# Patient Record
Sex: FEMALE | Race: WHITE | NOT HISPANIC OR LATINO | URBAN - METROPOLITAN AREA
[De-identification: names, ages, dates, MRNs, and addresses within clinical notes are randomized per-mention and may not be internally consistent; named-entity substitution may affect disease eponyms.]

---

## 2019-06-21 ENCOUNTER — INPATIENT (INPATIENT)
Facility: HOSPITAL | Age: 74
LOS: 2 days | Discharge: HOME | DRG: 690 | End: 2019-06-24
Attending: INTERNAL MEDICINE | Admitting: INTERNAL MEDICINE
Payer: MEDICARE

## 2019-06-21 VITALS
RESPIRATION RATE: 20 BRPM | OXYGEN SATURATION: 96 % | TEMPERATURE: 98 F | HEART RATE: 83 BPM | WEIGHT: 126.1 LBS | DIASTOLIC BLOOD PRESSURE: 70 MMHG | SYSTOLIC BLOOD PRESSURE: 115 MMHG

## 2019-06-21 DIAGNOSIS — E80.6 OTHER DISORDERS OF BILIRUBIN METABOLISM: ICD-10-CM

## 2019-06-21 DIAGNOSIS — I10 ESSENTIAL (PRIMARY) HYPERTENSION: ICD-10-CM

## 2019-06-21 DIAGNOSIS — L40.50 ARTHROPATHIC PSORIASIS, UNSPECIFIED: ICD-10-CM

## 2019-06-21 DIAGNOSIS — N10 ACUTE PYELONEPHRITIS: ICD-10-CM

## 2019-06-21 DIAGNOSIS — R94.5 ABNORMAL RESULTS OF LIVER FUNCTION STUDIES: ICD-10-CM

## 2019-06-21 DIAGNOSIS — E87.6 HYPOKALEMIA: ICD-10-CM

## 2019-06-21 LAB
ALBUMIN SERPL ELPH-MCNC: 2.9 G/DL — LOW (ref 3.3–5)
ALBUMIN SERPL ELPH-MCNC: 3.2 G/DL — LOW (ref 3.3–5)
ALP SERPL-CCNC: 245 U/L — HIGH (ref 40–120)
ALP SERPL-CCNC: 277 U/L — HIGH (ref 40–120)
ALT FLD-CCNC: 271 U/L — HIGH (ref 10–45)
ALT FLD-CCNC: 301 U/L — HIGH (ref 10–45)
ANION GAP SERPL CALC-SCNC: 16 MMOL/L — SIGNIFICANT CHANGE UP (ref 5–17)
ANION GAP SERPL CALC-SCNC: 16 MMOL/L — SIGNIFICANT CHANGE UP (ref 5–17)
APPEARANCE UR: ABNORMAL
APTT BLD: 30.8 SEC — SIGNIFICANT CHANGE UP (ref 27.5–36.3)
AST SERPL-CCNC: 303 U/L — HIGH (ref 10–40)
AST SERPL-CCNC: 359 U/L — HIGH (ref 10–40)
BACTERIA # UR AUTO: ABNORMAL
BASE EXCESS BLDV CALC-SCNC: 6.7 MMOL/L — HIGH (ref -2–2)
BASOPHILS # BLD AUTO: 0 K/UL — SIGNIFICANT CHANGE UP (ref 0–0.2)
BASOPHILS NFR BLD AUTO: 0.3 % — SIGNIFICANT CHANGE UP (ref 0–2)
BILIRUB SERPL-MCNC: 2.4 MG/DL — HIGH (ref 0.2–1.2)
BILIRUB SERPL-MCNC: 2.7 MG/DL — HIGH (ref 0.2–1.2)
BILIRUB UR-MCNC: NEGATIVE — SIGNIFICANT CHANGE UP
BUN SERPL-MCNC: 12 MG/DL — SIGNIFICANT CHANGE UP (ref 7–23)
BUN SERPL-MCNC: 14 MG/DL — SIGNIFICANT CHANGE UP (ref 7–23)
CA-I SERPL-SCNC: 1.14 MMOL/L — SIGNIFICANT CHANGE UP (ref 1.12–1.3)
CALCIUM SERPL-MCNC: 9.2 MG/DL — SIGNIFICANT CHANGE UP (ref 8.4–10.5)
CALCIUM SERPL-MCNC: 9.5 MG/DL — SIGNIFICANT CHANGE UP (ref 8.4–10.5)
CHLORIDE BLDV-SCNC: 97 MMOL/L — SIGNIFICANT CHANGE UP (ref 96–108)
CHLORIDE SERPL-SCNC: 93 MMOL/L — LOW (ref 96–108)
CHLORIDE SERPL-SCNC: 95 MMOL/L — LOW (ref 96–108)
CO2 BLDV-SCNC: 34 MMOL/L — HIGH (ref 22–30)
CO2 SERPL-SCNC: 27 MMOL/L — SIGNIFICANT CHANGE UP (ref 22–31)
CO2 SERPL-SCNC: 27 MMOL/L — SIGNIFICANT CHANGE UP (ref 22–31)
COLOR SPEC: YELLOW — SIGNIFICANT CHANGE UP
CREAT SERPL-MCNC: 0.64 MG/DL — SIGNIFICANT CHANGE UP (ref 0.5–1.3)
CREAT SERPL-MCNC: 0.78 MG/DL — SIGNIFICANT CHANGE UP (ref 0.5–1.3)
DIFF PNL FLD: ABNORMAL
EOSINOPHIL # BLD AUTO: 0.1 K/UL — SIGNIFICANT CHANGE UP (ref 0–0.5)
EOSINOPHIL NFR BLD AUTO: 0.4 % — SIGNIFICANT CHANGE UP (ref 0–6)
EPI CELLS # UR: 1 /HPF — SIGNIFICANT CHANGE UP
GAS PNL BLDV: 134 MMOL/L — LOW (ref 135–145)
GAS PNL BLDV: SIGNIFICANT CHANGE UP
GAS PNL BLDV: SIGNIFICANT CHANGE UP
GLUCOSE BLDC GLUCOMTR-MCNC: 123 MG/DL — HIGH (ref 70–99)
GLUCOSE BLDV-MCNC: 164 MG/DL — HIGH (ref 70–99)
GLUCOSE SERPL-MCNC: 155 MG/DL — HIGH (ref 70–99)
GLUCOSE SERPL-MCNC: 181 MG/DL — HIGH (ref 70–99)
GLUCOSE UR QL: NEGATIVE — SIGNIFICANT CHANGE UP
HAV IGM SER-ACNC: SIGNIFICANT CHANGE UP
HBV CORE IGM SER-ACNC: SIGNIFICANT CHANGE UP
HBV SURFACE AG SER-ACNC: SIGNIFICANT CHANGE UP
HCO3 BLDV-SCNC: 33 MMOL/L — HIGH (ref 21–29)
HCT VFR BLD CALC: 46.7 % — HIGH (ref 34.5–45)
HCT VFR BLDA CALC: 50 % — SIGNIFICANT CHANGE UP (ref 39–50)
HCV AB S/CO SERPL IA: 0.08 S/CO — SIGNIFICANT CHANGE UP (ref 0–0.99)
HCV AB SERPL-IMP: SIGNIFICANT CHANGE UP
HGB BLD CALC-MCNC: 16.4 G/DL — HIGH (ref 11.5–15.5)
HGB BLD-MCNC: 15.5 G/DL — SIGNIFICANT CHANGE UP (ref 11.5–15.5)
HYALINE CASTS # UR AUTO: 3 /LPF — HIGH (ref 0–2)
INR BLD: 1.17 RATIO — HIGH (ref 0.88–1.16)
KETONES UR-MCNC: NEGATIVE — SIGNIFICANT CHANGE UP
LACTATE BLDV-MCNC: 1.9 MMOL/L — SIGNIFICANT CHANGE UP (ref 0.7–2)
LEUKOCYTE ESTERASE UR-ACNC: ABNORMAL
LYMPHOCYTES # BLD AUTO: 1.5 K/UL — SIGNIFICANT CHANGE UP (ref 1–3.3)
LYMPHOCYTES # BLD AUTO: 10.2 % — LOW (ref 13–44)
MCHC RBC-ENTMCNC: 28.9 PG — SIGNIFICANT CHANGE UP (ref 27–34)
MCHC RBC-ENTMCNC: 33.1 GM/DL — SIGNIFICANT CHANGE UP (ref 32–36)
MCV RBC AUTO: 87.2 FL — SIGNIFICANT CHANGE UP (ref 80–100)
MONOCYTES # BLD AUTO: 1.5 K/UL — HIGH (ref 0–0.9)
MONOCYTES NFR BLD AUTO: 9.8 % — SIGNIFICANT CHANGE UP (ref 2–14)
NEUTROPHILS # BLD AUTO: 11.8 K/UL — HIGH (ref 1.8–7.4)
NEUTROPHILS NFR BLD AUTO: 79.3 % — HIGH (ref 43–77)
NITRITE UR-MCNC: NEGATIVE — SIGNIFICANT CHANGE UP
PCO2 BLDV: 52 MMHG — HIGH (ref 35–50)
PH BLDV: 7.42 — SIGNIFICANT CHANGE UP (ref 7.35–7.45)
PH UR: 6.5 — SIGNIFICANT CHANGE UP (ref 5–8)
PLATELET # BLD AUTO: 319 K/UL — SIGNIFICANT CHANGE UP (ref 150–400)
PO2 BLDV: 28 MMHG — SIGNIFICANT CHANGE UP (ref 25–45)
POTASSIUM BLDV-SCNC: 3 MMOL/L — LOW (ref 3.5–5.3)
POTASSIUM SERPL-MCNC: 3 MMOL/L — LOW (ref 3.5–5.3)
POTASSIUM SERPL-MCNC: 3.1 MMOL/L — LOW (ref 3.5–5.3)
POTASSIUM SERPL-SCNC: 3 MMOL/L — LOW (ref 3.5–5.3)
POTASSIUM SERPL-SCNC: 3.1 MMOL/L — LOW (ref 3.5–5.3)
PROT SERPL-MCNC: 7 G/DL — SIGNIFICANT CHANGE UP (ref 6–8.3)
PROT SERPL-MCNC: 7.9 G/DL — SIGNIFICANT CHANGE UP (ref 6–8.3)
PROT UR-MCNC: ABNORMAL
PROTHROM AB SERPL-ACNC: 13.4 SEC — HIGH (ref 10–12.9)
RBC # BLD: 5.35 M/UL — HIGH (ref 3.8–5.2)
RBC # FLD: 12.9 % — SIGNIFICANT CHANGE UP (ref 10.3–14.5)
RBC CASTS # UR COMP ASSIST: 4 /HPF — SIGNIFICANT CHANGE UP (ref 0–4)
SAO2 % BLDV: 46 % — LOW (ref 67–88)
SODIUM SERPL-SCNC: 136 MMOL/L — SIGNIFICANT CHANGE UP (ref 135–145)
SODIUM SERPL-SCNC: 138 MMOL/L — SIGNIFICANT CHANGE UP (ref 135–145)
SP GR SPEC: 1.01 — LOW (ref 1.01–1.02)
UROBILINOGEN FLD QL: ABNORMAL
WBC # BLD: 14.9 K/UL — HIGH (ref 3.8–10.5)
WBC # FLD AUTO: 14.9 K/UL — HIGH (ref 3.8–10.5)
WBC UR QL: 33 /HPF — HIGH (ref 0–5)

## 2019-06-21 PROCEDURE — 99285 EMERGENCY DEPT VISIT HI MDM: CPT | Mod: GC

## 2019-06-21 PROCEDURE — 71045 X-RAY EXAM CHEST 1 VIEW: CPT | Mod: 26

## 2019-06-21 PROCEDURE — 74177 CT ABD & PELVIS W/CONTRAST: CPT | Mod: 26

## 2019-06-21 RX ORDER — CELECOXIB 200 MG/1
200 CAPSULE ORAL
Refills: 0 | Status: DISCONTINUED | OUTPATIENT
Start: 2019-06-21 | End: 2019-06-24

## 2019-06-21 RX ORDER — INSULIN LISPRO 100/ML
VIAL (ML) SUBCUTANEOUS
Refills: 0 | Status: DISCONTINUED | OUTPATIENT
Start: 2019-06-21 | End: 2019-06-24

## 2019-06-21 RX ORDER — CEFTRIAXONE 500 MG/1
1000 INJECTION, POWDER, FOR SOLUTION INTRAMUSCULAR; INTRAVENOUS EVERY 24 HOURS
Refills: 0 | Status: DISCONTINUED | OUTPATIENT
Start: 2019-06-21 | End: 2019-06-24

## 2019-06-21 RX ORDER — POTASSIUM CHLORIDE 20 MEQ
10 PACKET (EA) ORAL
Refills: 0 | Status: DISCONTINUED | OUTPATIENT
Start: 2019-06-21 | End: 2019-06-21

## 2019-06-21 RX ORDER — LANOLIN ALCOHOL/MO/W.PET/CERES
1 CREAM (GRAM) TOPICAL AT BEDTIME
Refills: 0 | Status: DISCONTINUED | OUTPATIENT
Start: 2019-06-21 | End: 2019-06-23

## 2019-06-21 RX ORDER — LANOLIN ALCOHOL/MO/W.PET/CERES
1 CREAM (GRAM) TOPICAL
Qty: 0 | Refills: 0 | DISCHARGE

## 2019-06-21 RX ORDER — CELECOXIB 200 MG/1
1 CAPSULE ORAL
Qty: 0 | Refills: 0 | DISCHARGE

## 2019-06-21 RX ORDER — SODIUM CHLORIDE 9 MG/ML
1000 INJECTION, SOLUTION INTRAVENOUS
Refills: 0 | Status: DISCONTINUED | OUTPATIENT
Start: 2019-06-21 | End: 2019-06-24

## 2019-06-21 RX ORDER — CEFTRIAXONE 500 MG/1
1000 INJECTION, POWDER, FOR SOLUTION INTRAMUSCULAR; INTRAVENOUS ONCE
Refills: 0 | Status: COMPLETED | OUTPATIENT
Start: 2019-06-21 | End: 2019-06-21

## 2019-06-21 RX ORDER — GLUCAGON INJECTION, SOLUTION 0.5 MG/.1ML
1 INJECTION, SOLUTION SUBCUTANEOUS ONCE
Refills: 0 | Status: DISCONTINUED | OUTPATIENT
Start: 2019-06-21 | End: 2019-06-24

## 2019-06-21 RX ORDER — SODIUM CHLORIDE 9 MG/ML
1000 INJECTION, SOLUTION INTRAVENOUS ONCE
Refills: 0 | Status: COMPLETED | OUTPATIENT
Start: 2019-06-21 | End: 2019-06-21

## 2019-06-21 RX ORDER — ENOXAPARIN SODIUM 100 MG/ML
40 INJECTION SUBCUTANEOUS DAILY
Refills: 0 | Status: DISCONTINUED | OUTPATIENT
Start: 2019-06-21 | End: 2019-06-24

## 2019-06-21 RX ORDER — AMLODIPINE BESYLATE 2.5 MG/1
1 TABLET ORAL
Qty: 0 | Refills: 0 | DISCHARGE

## 2019-06-21 RX ORDER — DEXTROSE 50 % IN WATER 50 %
15 SYRINGE (ML) INTRAVENOUS ONCE
Refills: 0 | Status: DISCONTINUED | OUTPATIENT
Start: 2019-06-21 | End: 2019-06-24

## 2019-06-21 RX ORDER — POTASSIUM CHLORIDE 20 MEQ
40 PACKET (EA) ORAL ONCE
Refills: 0 | Status: COMPLETED | OUTPATIENT
Start: 2019-06-21 | End: 2019-06-21

## 2019-06-21 RX ORDER — ACETAMINOPHEN 500 MG
650 TABLET ORAL ONCE
Refills: 0 | Status: COMPLETED | OUTPATIENT
Start: 2019-06-21 | End: 2019-06-21

## 2019-06-21 RX ORDER — DEXTROSE 50 % IN WATER 50 %
25 SYRINGE (ML) INTRAVENOUS ONCE
Refills: 0 | Status: DISCONTINUED | OUTPATIENT
Start: 2019-06-21 | End: 2019-06-24

## 2019-06-21 RX ORDER — INSULIN LISPRO 100/ML
VIAL (ML) SUBCUTANEOUS AT BEDTIME
Refills: 0 | Status: DISCONTINUED | OUTPATIENT
Start: 2019-06-21 | End: 2019-06-24

## 2019-06-21 RX ORDER — DEXTROSE 50 % IN WATER 50 %
12.5 SYRINGE (ML) INTRAVENOUS ONCE
Refills: 0 | Status: DISCONTINUED | OUTPATIENT
Start: 2019-06-21 | End: 2019-06-24

## 2019-06-21 RX ORDER — AMLODIPINE BESYLATE 2.5 MG/1
5 TABLET ORAL DAILY
Refills: 0 | Status: DISCONTINUED | OUTPATIENT
Start: 2019-06-21 | End: 2019-06-24

## 2019-06-21 RX ADMIN — Medication 650 MILLIGRAM(S): at 12:02

## 2019-06-21 RX ADMIN — SODIUM CHLORIDE 1000 MILLILITER(S): 9 INJECTION, SOLUTION INTRAVENOUS at 12:02

## 2019-06-21 RX ADMIN — CEFTRIAXONE 100 MILLIGRAM(S): 500 INJECTION, POWDER, FOR SOLUTION INTRAMUSCULAR; INTRAVENOUS at 12:02

## 2019-06-21 RX ADMIN — SODIUM CHLORIDE 500 MILLILITER(S): 9 INJECTION, SOLUTION INTRAVENOUS at 20:19

## 2019-06-21 RX ADMIN — Medication 40 MILLIEQUIVALENT(S): at 20:19

## 2019-06-21 RX ADMIN — Medication 1 MILLIGRAM(S): at 22:53

## 2019-06-21 RX ADMIN — Medication 650 MILLIGRAM(S): at 12:03

## 2019-06-21 NOTE — H&P ADULT - PROBLEM SELECTOR PLAN 2
CT abdomen noted. US pending. S/P Cholecystectomy. On Cosentyx . [facility] consulted by ED.

## 2019-06-21 NOTE — H&P ADULT - RS GEN PE MLT RESP DETAILS PC
good air movement/respirations non-labored/no rales/no chest wall tenderness/no intercostal retractions/airway patent/clear to auscultation bilaterally/breath sounds equal/no rhonchi/no subcutaneous emphysema/normal

## 2019-06-21 NOTE — ED PROVIDER NOTE - PHYSICAL EXAMINATION
*GEN:   comfortable, in no acute distress, AOx3  *EYES:   pupils equally round and reactive to light, extra-occular movements intact  *HEENT:   airway patent, moist mucosal membranes, full ROM neck  *CV:   regular rate and rhythm  *RESP:   clear to auscultation bilaterally, non-labored  *ABD:   soft, non-tender  *:   no cva/flank tenderness  *EXTREM:   arthritic fingers bilaterally, no MSK tenderness, full ROM throughout  *SKIN:   dry, intact  *NEURO:   AOx3, no focal weakness or loss of sensation, gait normal *GEN:   comfortable, in no acute distress, AOx3  *EYES:   pupils equally round and reactive to light, extra-occular movements intact  *HEENT:   airway patent, moist mucosal membranes, full ROM neck  *CV:   regular rate and rhythm  *RESP:   clear to auscultation bilaterally, non-labored  *ABD:   soft, non-tender  *:   no cva/flank tenderness  *EXTREM:   arthritic fingers bilaterally, no MSK tenderness, full ROM throughout  *SKIN:   dry, intact  *NEURO:   AOx3, no focal weakness or loss of sensation, gait normal  Attending Jewell Guido: Gen: NAD, heent: atrauamtic, eomi, perrla, mmm, op pink, uvula midline, neck; nttp, no nuchal rigidity, chest: nttp, no crepitus, cv: rrr, no murmurs, lungs: ctab, abd: soft, nontender, nondistended, no peritoneal signs, +BS, no guarding, ext: wwp, neg homans, skin: no rash, neuro: awake and alert, following commands, speech clear, sensation and strength intact, no focal deficits

## 2019-06-21 NOTE — ED PROVIDER NOTE - PROGRESS NOTE DETAILS
Attending Jewell Guido: labs shows elevated transaminitis. pt s/p cholecystectomy. abdomen soft on exam. unclear cause. consider tick borne illness, will also add on ck. pt wwp. good cap refill.a febrile rectally Federico Owens PGY2: d/w dr. vallejo agreed w/ admission, requested consult hepatology - paged gi fellow

## 2019-06-21 NOTE — ED PROVIDER NOTE - CLINICAL SUMMARY MEDICAL DECISION MAKING FREE TEXT BOX
74F w/ concern for infection, uti - will check labs, urine, cxr, reassess 74F w/ concern for infection, uti - will check labs, urine, cxr, reassess  Attending Jewell Guido: 75 y/o female sent in for concern for abnormal labs. upon arrival pt hemodynamically stable. concern for possible urinary infection vs report. pt cultured. labs showed evidence of transaminitis. pt with prior choleycstectomy. no known h/o lyme disease or risk factors for tick borne illness. no evidence of rhabo. no h/o sig tylenol use. less likely shock liver from infectious etiology. UA positive for infection and pt given abx. will ct ab/pel to further evaluate. plan to admit. will check ebv as well and viral work up

## 2019-06-21 NOTE — H&P ADULT - NEGATIVE CARDIOVASCULAR SYMPTOMS
no palpitations/no dyspnea on exertion/no peripheral edema/no paroxysmal nocturnal dyspnea/no chest pain/no orthopnea/no claudication

## 2019-06-21 NOTE — ED ADULT TRIAGE NOTE - CHIEF COMPLAINT QUOTE
She wasn't feeling well, she was weak and had fever. The doctor called and said all her labs are abnl.

## 2019-06-21 NOTE — ED ADULT NURSE NOTE - OBJECTIVE STATEMENT
74 yr old female amb to ED, accomp by daughter and family. Pt refereed for elevated WBC. Has been having frequency of urine . Awake No change in mental status Speaks Belgian Would like daughter to translate. Afebrile Had fever yesterday 101.3. H/o HTN HLD type2 dm on metformin. Denies sob or chest pain Denies abd pain Denies N/V/D Denies candida blood in urine, but had pos blood in urine test Denies back pain

## 2019-06-21 NOTE — H&P ADULT - GASTROINTESTINAL DETAILS
no distention/bowel sounds normal/no bruit/nontender/no masses palpable/no guarding/soft/no rebound tenderness/normal

## 2019-06-21 NOTE — H&P ADULT - HISTORY OF PRESENT ILLNESS
74F w/ pmh HTN, HLD, DM, psoriatic arthritis (on cosentyx, last injection last wk) s/p cholecystectomy - sent by pcp bc abn labs - has had weakness x 1 wk, difficulty ambulating, decreased PO, fever yesterday 101.3 but not today, didn't take fever meds today. No recent travel, medication change, illness, or hospitalization. No cough, chest pain, dysuria. +frequency.

## 2019-06-21 NOTE — ED PROVIDER NOTE - OBJECTIVE STATEMENT
74F w/ pmh ** sent from ** for fever and abnormal labs. Had labs 74F w/ pmh HTN, HLD, DM, psoriatic arthritis (on cosentyx, last injection last wk) s/p cholecystectomy - sent by pcp bc abn labs - has had weakness x 1 wk, difficulty ambulating, decreased PO, fever yesterday 101.3 but not today, didn't take fever meds today. No recent travel, medication change, illness, or hospitalization. No cough, chest pain, dysuria. +frequency.     Primarily Turkmen speaking, family translating at bedside.    PCP: Marycruz Herzog 74F w/ pmh HTN, HLD, DM, psoriatic arthritis (on cosentyx, last injection last wk) s/p cholecystectomy - sent by pcp bc abn labs - has had weakness x 1 wk, difficulty ambulating, decreased PO, fever yesterday 101.3 but not today, didn't take fever meds today. No recent travel, medication change, illness, or hospitalization. No cough, chest pain, dysuria. +frequency.     Primarily [place] speaking, family translating at bedside.    Abn labs 6/20: k3.2, crp 570, wbc 15.5  Normal labs 4/12/19: Cr .65, lytes wnl, CRP 11.7, WBC 11.5  UA 6/20/19: 2+ nitrite, 2+ leuk esterase, 2+ blood, , few bacteria  PCP: aMrycruz Herzog

## 2019-06-21 NOTE — ED PROVIDER NOTE - ATTENDING CONTRIBUTION TO CARE
Attending MD Jewell Guido:  I personally have seen and examined this patient.  Resident note reviewed and agree on plan of care and except where noted.  See HPI, PE, and MDM for details.

## 2019-06-22 LAB
ALBUMIN SERPL ELPH-MCNC: 2.6 G/DL — LOW (ref 3.3–5)
ALP SERPL-CCNC: 216 U/L — HIGH (ref 40–120)
ALT FLD-CCNC: 192 U/L — HIGH (ref 10–45)
ANION GAP SERPL CALC-SCNC: 18 MMOL/L — HIGH (ref 5–17)
AST SERPL-CCNC: 101 U/L — HIGH (ref 10–40)
BILIRUB DIRECT SERPL-MCNC: 0.3 MG/DL — HIGH (ref 0–0.2)
BILIRUB INDIRECT FLD-MCNC: 0.7 MG/DL — SIGNIFICANT CHANGE UP (ref 0.2–1)
BILIRUB SERPL-MCNC: 1 MG/DL — SIGNIFICANT CHANGE UP (ref 0.2–1.2)
BUN SERPL-MCNC: 9 MG/DL — SIGNIFICANT CHANGE UP (ref 7–23)
CALCIUM SERPL-MCNC: 9.3 MG/DL — SIGNIFICANT CHANGE UP (ref 8.4–10.5)
CHLORIDE SERPL-SCNC: 97 MMOL/L — SIGNIFICANT CHANGE UP (ref 96–108)
CO2 SERPL-SCNC: 24 MMOL/L — SIGNIFICANT CHANGE UP (ref 22–31)
CREAT SERPL-MCNC: 0.63 MG/DL — SIGNIFICANT CHANGE UP (ref 0.5–1.3)
CULTURE RESULTS: SIGNIFICANT CHANGE UP
EBV EA AB SER IA-ACNC: 15.3 U/ML — HIGH
EBV EA AB TITR SER IF: POSITIVE
EBV EA IGG SER-ACNC: POSITIVE
EBV NA IGG SER IA-ACNC: >600 U/ML — HIGH
EBV PATRN SPEC IB-IMP: SIGNIFICANT CHANGE UP
EBV VCA IGG AVIDITY SER QL IA: POSITIVE
EBV VCA IGM SER IA-ACNC: <10 U/ML — SIGNIFICANT CHANGE UP
EBV VCA IGM SER IA-ACNC: >750 U/ML — HIGH
EBV VCA IGM TITR FLD: NEGATIVE — SIGNIFICANT CHANGE UP
GLUCOSE BLDC GLUCOMTR-MCNC: 123 MG/DL — HIGH (ref 70–99)
GLUCOSE BLDC GLUCOMTR-MCNC: 152 MG/DL — HIGH (ref 70–99)
GLUCOSE BLDC GLUCOMTR-MCNC: 155 MG/DL — HIGH (ref 70–99)
GLUCOSE BLDC GLUCOMTR-MCNC: 172 MG/DL — HIGH (ref 70–99)
GLUCOSE SERPL-MCNC: 120 MG/DL — HIGH (ref 70–99)
HAV IGM SER-ACNC: SIGNIFICANT CHANGE UP
HBA1C BLD-MCNC: 7 % — HIGH (ref 4–5.6)
HBV CORE IGM SER-ACNC: SIGNIFICANT CHANGE UP
HBV SURFACE AG SER-ACNC: SIGNIFICANT CHANGE UP
HCT VFR BLD CALC: 43.3 % — SIGNIFICANT CHANGE UP (ref 34.5–45)
HCV AB S/CO SERPL IA: 0.08 S/CO — SIGNIFICANT CHANGE UP (ref 0–0.99)
HCV AB SERPL-IMP: SIGNIFICANT CHANGE UP
HGB BLD-MCNC: 14.3 G/DL — SIGNIFICANT CHANGE UP (ref 11.5–15.5)
MAGNESIUM SERPL-MCNC: 1.8 MG/DL — SIGNIFICANT CHANGE UP (ref 1.6–2.6)
MCHC RBC-ENTMCNC: 28.1 PG — SIGNIFICANT CHANGE UP (ref 27–34)
MCHC RBC-ENTMCNC: 33 GM/DL — SIGNIFICANT CHANGE UP (ref 32–36)
MCV RBC AUTO: 85.1 FL — SIGNIFICANT CHANGE UP (ref 80–100)
PHOSPHATE SERPL-MCNC: 4 MG/DL — SIGNIFICANT CHANGE UP (ref 2.5–4.5)
PLATELET # BLD AUTO: 358 K/UL — SIGNIFICANT CHANGE UP (ref 150–400)
POTASSIUM SERPL-MCNC: 3 MMOL/L — LOW (ref 3.5–5.3)
POTASSIUM SERPL-SCNC: 3 MMOL/L — LOW (ref 3.5–5.3)
PROT SERPL-MCNC: 6.7 G/DL — SIGNIFICANT CHANGE UP (ref 6–8.3)
RBC # BLD: 5.09 M/UL — SIGNIFICANT CHANGE UP (ref 3.8–5.2)
RBC # FLD: 14.2 % — SIGNIFICANT CHANGE UP (ref 10.3–14.5)
SODIUM SERPL-SCNC: 139 MMOL/L — SIGNIFICANT CHANGE UP (ref 135–145)
SPECIMEN SOURCE: SIGNIFICANT CHANGE UP
WBC # BLD: 14.23 K/UL — HIGH (ref 3.8–10.5)
WBC # FLD AUTO: 14.23 K/UL — HIGH (ref 3.8–10.5)

## 2019-06-22 PROCEDURE — 99222 1ST HOSP IP/OBS MODERATE 55: CPT | Mod: GC

## 2019-06-22 PROCEDURE — 99223 1ST HOSP IP/OBS HIGH 75: CPT

## 2019-06-22 RX ORDER — POTASSIUM CHLORIDE 20 MEQ
10 PACKET (EA) ORAL ONCE
Refills: 0 | Status: COMPLETED | OUTPATIENT
Start: 2019-06-22 | End: 2019-06-22

## 2019-06-22 RX ORDER — POTASSIUM CHLORIDE 20 MEQ
40 PACKET (EA) ORAL EVERY 4 HOURS
Refills: 0 | Status: COMPLETED | OUTPATIENT
Start: 2019-06-22 | End: 2019-06-22

## 2019-06-22 RX ADMIN — Medication 40 MILLIEQUIVALENT(S): at 17:45

## 2019-06-22 RX ADMIN — Medication 1 MILLIGRAM(S): at 21:49

## 2019-06-22 RX ADMIN — Medication 40 MILLIEQUIVALENT(S): at 13:18

## 2019-06-22 RX ADMIN — CELECOXIB 200 MILLIGRAM(S): 200 CAPSULE ORAL at 06:10

## 2019-06-22 RX ADMIN — CEFTRIAXONE 100 MILLIGRAM(S): 500 INJECTION, POWDER, FOR SOLUTION INTRAMUSCULAR; INTRAVENOUS at 13:18

## 2019-06-22 RX ADMIN — Medication 100 MILLIEQUIVALENT(S): at 13:17

## 2019-06-22 RX ADMIN — Medication 1: at 17:56

## 2019-06-22 RX ADMIN — ENOXAPARIN SODIUM 40 MILLIGRAM(S): 100 INJECTION SUBCUTANEOUS at 13:17

## 2019-06-22 RX ADMIN — AMLODIPINE BESYLATE 5 MILLIGRAM(S): 2.5 TABLET ORAL at 06:10

## 2019-06-22 RX ADMIN — CELECOXIB 200 MILLIGRAM(S): 200 CAPSULE ORAL at 17:45

## 2019-06-22 NOTE — PROVIDER CONTACT NOTE (MEDICATION) - RECOMMENDATIONS
educated pt and family on diabetic protocols, pt educated to call for RN or PCA prior to eating tray so a blood glucose can be performed, pt and family verbalized understanding

## 2019-06-22 NOTE — CONSULT NOTE ADULT - SUBJECTIVE AND OBJECTIVE BOX
ED DEAL  [id number]    HISTORY OF PRESENT ILLNESS:    73 yo F hx of PsA (recently started on cosentyx 3 weeks ago), HTN, HLD, DM p/w fevers.    Patient stated she has been having weakness and decreased PO intake for 1 week. She went to her PMD and was found to have low potassium along with a fever and was told to go to the ER for evaluation. Patient was found to have pyelonephritis and was started on antibiotics.        PAST MEDICAL & SURGICAL HISTORY:  HTN (hypertension)  Psoriatic arthritis      Review of Systems:  Gen:  No fevers/chills, weight loss  HEENT: No blurry vision, no difficulty swallowing, no oral or nasal ulcers  CVS: No chest pain/palpitations  Resp: No SOB/wheezing  GI: No N/V/C/D/abdominal pain  MSK:  Skin: No new rashes  Neuro: No headaches    MEDICATIONS  (STANDING):  amLODIPine   Tablet 5 milliGRAM(s) Oral daily  cefTRIAXone   IVPB 1000 milliGRAM(s) IV Intermittent every 24 hours  celecoxib 200 milliGRAM(s) Oral two times a day  dextrose 5%. 1000 milliLiter(s) (50 mL/Hr) IV Continuous <Continuous>  dextrose 50% Injectable 12.5 Gram(s) IV Push once  dextrose 50% Injectable 25 Gram(s) IV Push once  dextrose 50% Injectable 25 Gram(s) IV Push once  enoxaparin Injectable 40 milliGRAM(s) SubCutaneous daily  insulin lispro (HumaLOG) corrective regimen sliding scale   SubCutaneous three times a day before meals  insulin lispro (HumaLOG) corrective regimen sliding scale   SubCutaneous at bedtime  melatonin 1 milliGRAM(s) Oral at bedtime  potassium chloride    Tablet ER 40 milliEquivalent(s) Oral every 4 hours    MEDICATIONS  (PRN):  dextrose 40% Gel 15 Gram(s) Oral once PRN Blood Glucose LESS THAN 70 milliGRAM(s)/deciliter  glucagon  Injectable 1 milliGRAM(s) IntraMuscular once PRN Glucose LESS THAN 70 milligrams/deciliter      Allergies    No Known Allergies    Intolerances    Vital Signs Last 24 Hrs  T(C): 36.5 (2019 13:32), Max: 37.3 (2019 16:52)  T(F): 97.7 (2019 13:32), Max: 99.1 (2019 16:52)  HR: 86 (2019 13:32) (78 - 99)  BP: 111/73 (2019 13:32) (111/73 - 144/80)  BP(mean): 88 (2019 18:57) (88 - 88)  RR: 18 (2019 13:32) (18 - 20)  SpO2: 94% (2019 13:32) (94% - 99%)    Physical Exam:  General: No apparent distress  HEENT: EOMI, MMM  CVS: +S1/S2, RRR, no murmurs/rubs/gallops  Resp: CTA b/l. No crackles/wheezing  GI: Soft, NT/ND +BS  MSK:  Neuro: AAOx3  Skin: no visible rashes    LABS:                        14.3   14.23 )-----------( 358      ( 2019 12:03 )             43.3     -    139  |  97  |  9   ----------------------------<  120<H>  3.0<L>   |  24  |  0.63    Ca    9.3      2019 06:33  Phos  4.0       Mg     1.8         TPro  6.7  /  Alb  2.6<L>  /  TBili  1.0  /  DBili  0.3<H>  /  AST  101<H>  /  ALT  192<H>  /  AlkPhos  216<H>  22    PT/INR - ( 2019 19:32 )   PT: 13.4 sec;   INR: 1.17 ratio         PTT - ( 2019 19:32 )  PTT:30.8 sec  Urinalysis Basic - ( 2019 14:30 )    Color: Yellow / Appearance: Slightly Turbid / S.009 / pH: x  Gluc: x / Ketone: Negative  / Bili: Negative / Urobili: 6 mg/dL   Blood: x / Protein: Trace / Nitrite: Negative   Leuk Esterase: Large / RBC: 4 /hpf / WBC 33 /HPF   Sq Epi: x / Non Sq Epi: 1 /hpf / Bacteria: Few        RADIOLOGY & ADDITIONAL STUDIES:    EXAM:  CT ABDOMEN AND PELVIS IC                            PROCEDURE DATE:  2019            INTERPRETATION:  CLINICAL INFORMATION: Weakness for one week, difficulty   ambulating, fever, urinary frequency.    COMPARISON: None    PROCEDURE:   CT of the Abdomen and Pelvis was performed with intravenous contrast.   Intravenous contrast: 90 ml Omnipaque 350. 10 ml discarded.  Oral contrast: None.  Sagittal and coronal reformats were performed.    FINDINGS:    LOWER CHEST: Bibasilar subsegmental atelectasis. Coronary artery   calcifications.    LIVER: Within normal limits.  BILE DUCTS: Mild intrahepatic and extrahepatic ductal dilatation with the   common bile duct measuring up to 9 mm with distal tapering, likely   related to patient's postcholecystectomy state.  GALLBLADDER: Cholecystectomy.  SPLEEN: Within normal limits.  PANCREAS: Within normal limits.  ADRENALS: Within normal limits.  KIDNEYS/URETERS: Striated left nephrogram and mild enhancement of the   proximal ureter. No hydronephrosis no perinephric fluid collection.    BLADDER: Small focus of air within the bladder.  REPRODUCTIVE ORGANS: Supracervical hysterectomy. Pelvic floor laxity.    BOWEL: No bowel obstruction.   PERITONEUM: No ascites.  VESSELS:  Atherosclerotic changes of the abdominal aorta which is normal   in caliber.  RETROPERITONEUM: No lymphadenopathy.    ABDOMINAL WALL: Within normal limits.  BONES: Degenerative changes of the spine.    IMPRESSION:     Findings most consistent with left pyelonephritis; correlate with   urinalysis.    No bowel obstruction. ED DEAL  [id number]    HISTORY OF PRESENT ILLNESS:    73 yo F hx of PsA (recently started on cosentyx 3 weeks ago), HTN, HLD, DM p/w fevers.    Patient stated she has been having weakness and decreased PO intake for 1 week. She went to her PMD and was found to have low potassium along with a fever and was told to go to the ER for evaluation. Patient was found to have pyelonephritis and was started on antibiotics.    Patient has had PsA for >40 years, has been on and off prednisone, last took prednisone 3 weeks ago. Patient was on humira for 10 years, stopped working and 3 weeks ago switched to cosentyx, last dose was last week. Sees Dr. Mccoy as her rheumatologist.     PAST MEDICAL & SURGICAL HISTORY:  HTN (hypertension)  Psoriatic arthritis      Review of Systems:  Gen:  No fevers/chills, weight loss  HEENT: No blurry vision, no difficulty swallowing, no oral or nasal ulcers  CVS: No chest pain/palpitations  Resp: No SOB/wheezing  GI: No N/V/C/D/abdominal pain  MSK:  Skin: No new rashes  Neuro: No headaches    MEDICATIONS  (STANDING):  amLODIPine   Tablet 5 milliGRAM(s) Oral daily  cefTRIAXone   IVPB 1000 milliGRAM(s) IV Intermittent every 24 hours  celecoxib 200 milliGRAM(s) Oral two times a day  dextrose 5%. 1000 milliLiter(s) (50 mL/Hr) IV Continuous <Continuous>  dextrose 50% Injectable 12.5 Gram(s) IV Push once  dextrose 50% Injectable 25 Gram(s) IV Push once  dextrose 50% Injectable 25 Gram(s) IV Push once  enoxaparin Injectable 40 milliGRAM(s) SubCutaneous daily  insulin lispro (HumaLOG) corrective regimen sliding scale   SubCutaneous three times a day before meals  insulin lispro (HumaLOG) corrective regimen sliding scale   SubCutaneous at bedtime  melatonin 1 milliGRAM(s) Oral at bedtime  potassium chloride    Tablet ER 40 milliEquivalent(s) Oral every 4 hours    MEDICATIONS  (PRN):  dextrose 40% Gel 15 Gram(s) Oral once PRN Blood Glucose LESS THAN 70 milliGRAM(s)/deciliter  glucagon  Injectable 1 milliGRAM(s) IntraMuscular once PRN Glucose LESS THAN 70 milligrams/deciliter      Allergies    No Known Allergies    Intolerances    Vital Signs Last 24 Hrs  T(C): 36.5 (2019 13:32), Max: 37.3 (2019 16:52)  T(F): 97.7 (2019 13:32), Max: 99.1 (2019 16:52)  HR: 86 (2019 13:32) (78 - 99)  BP: 111/73 (2019 13:32) (111/73 - 144/80)  BP(mean): 88 (2019 18:57) (88 - 88)  RR: 18 (2019 13:32) (18 - 20)  SpO2: 94% (2019 13:32) (94% - 99%)    Physical Exam:  General: No apparent distress  HEENT: EOMI, MMM  CVS: +S1/S2, RRR, no murmurs/rubs/gallops  Resp: CTA b/l. No crackles/wheezing  GI: Soft, NT/ND +BS  MSK:  Neuro: AAOx3  Skin: no visible rashes    LABS:                        14.3   14.23 )-----------( 358      ( 2019 12:03 )             43.3     06-22    139  |  97  |  9   ----------------------------<  120<H>  3.0<L>   |  24  |  0.63    Ca    9.3      2019 06:33  Phos  4.0     06-  Mg     1.8         TPro  6.7  /  Alb  2.6<L>  /  TBili  1.0  /  DBili  0.3<H>  /  AST  101<H>  /  ALT  192<H>  /  AlkPhos  216<H>  -22    PT/INR - ( 2019 19:32 )   PT: 13.4 sec;   INR: 1.17 ratio         PTT - ( 2019 19:32 )  PTT:30.8 sec  Urinalysis Basic - ( 2019 14:30 )    Color: Yellow / Appearance: Slightly Turbid / S.009 / pH: x  Gluc: x / Ketone: Negative  / Bili: Negative / Urobili: 6 mg/dL   Blood: x / Protein: Trace / Nitrite: Negative   Leuk Esterase: Large / RBC: 4 /hpf / WBC 33 /HPF   Sq Epi: x / Non Sq Epi: 1 /hpf / Bacteria: Few        RADIOLOGY & ADDITIONAL STUDIES:    EXAM:  CT ABDOMEN AND PELVIS IC                            PROCEDURE DATE:  2019            INTERPRETATION:  CLINICAL INFORMATION: Weakness for one week, difficulty   ambulating, fever, urinary frequency.    COMPARISON: None    PROCEDURE:   CT of the Abdomen and Pelvis was performed with intravenous contrast.   Intravenous contrast: 90 ml Omnipaque 350. 10 ml discarded.  Oral contrast: None.  Sagittal and coronal reformats were performed.    FINDINGS:    LOWER CHEST: Bibasilar subsegmental atelectasis. Coronary artery   calcifications.    LIVER: Within normal limits.  BILE DUCTS: Mild intrahepatic and extrahepatic ductal dilatation with the   common bile duct measuring up to 9 mm with distal tapering, likely   related to patient's postcholecystectomy state.  GALLBLADDER: Cholecystectomy.  SPLEEN: Within normal limits.  PANCREAS: Within normal limits.  ADRENALS: Within normal limits.  KIDNEYS/URETERS: Striated left nephrogram and mild enhancement of the   proximal ureter. No hydronephrosis no perinephric fluid collection.    BLADDER: Small focus of air within the bladder.  REPRODUCTIVE ORGANS: Supracervical hysterectomy. Pelvic floor laxity.    BOWEL: No bowel obstruction.   PERITONEUM: No ascites.  VESSELS:  Atherosclerotic changes of the abdominal aorta which is normal   in caliber.  RETROPERITONEUM: No lymphadenopathy.    ABDOMINAL WALL: Within normal limits.  BONES: Degenerative changes of the spine.    IMPRESSION:     Findings most consistent with left pyelonephritis; correlate with   urinalysis.    No bowel obstruction. ED DEAL  [id number]    HISTORY OF PRESENT ILLNESS:    73 yo F hx of PsA (recently started on cosentyx 3 weeks ago), HTN, HLD, DM p/w fevers.    Patient stated she has been having weakness and decreased PO intake for 1 week. She went to her PMD and was found to have low potassium along with a fever and was told to go to the ER for evaluation. Patient was found to have pyelonephritis and was started on antibiotics.    Patient has had PsA for >40 years, has been on and off prednisone, last took prednisone 3 weeks ago. Patient was on humira for 10 years, stopped working and 3 weeks ago switched to cosentyx, last dose was last week. Sees Dr. Mccoy as her rheumatologist.     PAST MEDICAL & SURGICAL HISTORY:  HTN (hypertension)  Psoriatic arthritis      Review of Systems:  Gen:  No fevers/chills, weight loss  HEENT: No blurry vision, no difficulty swallowing, no oral or nasal ulcers  CVS: No chest pain/palpitations  Resp: No SOB/wheezing  GI: No N/V/C/D/abdominal pain  MSK:  Skin: No new rashes  Neuro: No headaches    MEDICATIONS  (STANDING):  amLODIPine   Tablet 5 milliGRAM(s) Oral daily  cefTRIAXone   IVPB 1000 milliGRAM(s) IV Intermittent every 24 hours  celecoxib 200 milliGRAM(s) Oral two times a day  dextrose 5%. 1000 milliLiter(s) (50 mL/Hr) IV Continuous <Continuous>  dextrose 50% Injectable 12.5 Gram(s) IV Push once  dextrose 50% Injectable 25 Gram(s) IV Push once  dextrose 50% Injectable 25 Gram(s) IV Push once  enoxaparin Injectable 40 milliGRAM(s) SubCutaneous daily  insulin lispro (HumaLOG) corrective regimen sliding scale   SubCutaneous three times a day before meals  insulin lispro (HumaLOG) corrective regimen sliding scale   SubCutaneous at bedtime  melatonin 1 milliGRAM(s) Oral at bedtime  potassium chloride    Tablet ER 40 milliEquivalent(s) Oral every 4 hours    MEDICATIONS  (PRN):  dextrose 40% Gel 15 Gram(s) Oral once PRN Blood Glucose LESS THAN 70 milliGRAM(s)/deciliter  glucagon  Injectable 1 milliGRAM(s) IntraMuscular once PRN Glucose LESS THAN 70 milligrams/deciliter      Allergies    No Known Allergies    Intolerances    Vital Signs Last 24 Hrs  T(C): 36.5 (2019 13:32), Max: 37.3 (2019 16:52)  T(F): 97.7 (2019 13:32), Max: 99.1 (2019 16:52)  HR: 86 (2019 13:32) (78 - 99)  BP: 111/73 (2019 13:32) (111/73 - 144/80)  BP(mean): 88 (2019 18:57) (88 - 88)  RR: 18 (2019 13:32) (18 - 20)  SpO2: 94% (2019 13:32) (94% - 99%)    Physical Exam:  General: No apparent distress  HEENT: EOMI, MMM  CVS: +S1/S2, RRR, no murmurs/rubs/gallops  Resp: CTA b/l. No crackles/wheezing  GI: Soft, NT/ND +BS  MSK: psoriatic mutilans hands bilaterally - cool and without active synovitis  Neuro: AAOx3  Skin: no visible rashes - no psoriasis    LABS:                        14.3   14.23 )-----------( 358      ( 2019 12:03 )             43.3     06-22    139  |  97  |  9   ----------------------------<  120<H>  3.0<L>   |  24  |  0.63    Ca    9.3      2019 06:33  Phos  4.0     06-22  Mg     1.8     06-22    TPro  6.7  /  Alb  2.6<L>  /  TBili  1.0  /  DBili  0.3<H>  /  AST  101<H>  /  ALT  192<H>  /  AlkPhos  216<H>  06-22    PT/INR - ( 2019 19:32 )   PT: 13.4 sec;   INR: 1.17 ratio         PTT - ( 2019 19:32 )  PTT:30.8 sec  Urinalysis Basic - ( 2019 14:30 )    Color: Yellow / Appearance: Slightly Turbid / S.009 / pH: x  Gluc: x / Ketone: Negative  / Bili: Negative / Urobili: 6 mg/dL   Blood: x / Protein: Trace / Nitrite: Negative   Leuk Esterase: Large / RBC: 4 /hpf / WBC 33 /HPF   Sq Epi: x / Non Sq Epi: 1 /hpf / Bacteria: Few        RADIOLOGY & ADDITIONAL STUDIES:    EXAM:  CT ABDOMEN AND PELVIS IC                            PROCEDURE DATE:  2019            INTERPRETATION:  CLINICAL INFORMATION: Weakness for one week, difficulty   ambulating, fever, urinary frequency.    COMPARISON: None    PROCEDURE:   CT of the Abdomen and Pelvis was performed with intravenous contrast.   Intravenous contrast: 90 ml Omnipaque 350. 10 ml discarded.  Oral contrast: None.  Sagittal and coronal reformats were performed.    FINDINGS:    LOWER CHEST: Bibasilar subsegmental atelectasis. Coronary artery   calcifications.    LIVER: Within normal limits.  BILE DUCTS: Mild intrahepatic and extrahepatic ductal dilatation with the   common bile duct measuring up to 9 mm with distal tapering, likely   related to patient's postcholecystectomy state.  GALLBLADDER: Cholecystectomy.  SPLEEN: Within normal limits.  PANCREAS: Within normal limits.  ADRENALS: Within normal limits.  KIDNEYS/URETERS: Striated left nephrogram and mild enhancement of the   proximal ureter. No hydronephrosis no perinephric fluid collection.    BLADDER: Small focus of air within the bladder.  REPRODUCTIVE ORGANS: Supracervical hysterectomy. Pelvic floor laxity.    BOWEL: No bowel obstruction.   PERITONEUM: No ascites.  VESSELS:  Atherosclerotic changes of the abdominal aorta which is normal   in caliber.  RETROPERITONEUM: No lymphadenopathy.    ABDOMINAL WALL: Within normal limits.  BONES: Degenerative changes of the spine.    IMPRESSION:     Findings most consistent with left pyelonephritis; correlate with   urinalysis.    No bowel obstruction.

## 2019-06-22 NOTE — PROVIDER CONTACT NOTE (MEDICATION) - ACTION/TREATMENT ORDERED:
educated pt and family, diet order changed to diabetic diet so kitchen staff will find RN or PCA prior to delivering tray

## 2019-06-22 NOTE — PROGRESS NOTE ADULT - ASSESSMENT
74F w/ pmh HTN, HLD, DM, psoriatic arthritis (on cosentyx, last injection last wk) s/p cholecystectomy - sent by pcp bc abn labs - has had weakness x 1 wk, difficulty ambulating, decreased PO, fever yesterday 101.3 but not today, didn't take fever meds today. No recent travel, medication change, illness, or hospitalization. No cough, chest pain, dysuria. +frequency.      Problem/Plan - 1:  ·  Problem: Acute pyelonephritis with UTI ; Plan: S/P Urine and blood cultures. I AV Abxs  Rocephin started in ED  and will continue.      Problem/Plan - 2:  ·  Problem: LFTs abnormal.  Plan: CT abdomen noted. US pending. S/P Cholecystectomy. On Cosentyx . [facility] consulted by ED.      Problem/Plan - 3:  ·  Problem: HTN (hypertension).  Plan: BP meds with hold parameters. BP readings fine.      Problem/Plan - 4:  ·  Problem: Psoriatic arthritis.  Plan: S/P Cosentyx and on Celebrex. Please consult Rheumatology in AM.      Problem/Plan - 5:  ·  Problem: Hypokalemia.  Plan: Replacing.

## 2019-06-22 NOTE — PROVIDER CONTACT NOTE (MEDICATION) - ASSESSMENT
pt Sammarinese speaking family at bedside speaking fluent English stating pt is "not taking sub q insulin, pt takes metformin" pt and family educated on diabetes protocols while in hospital verbalized understanding, still refusing insulin

## 2019-06-22 NOTE — CONSULT NOTE ADULT - ASSESSMENT
Impression:  # Elevated Liver Tests: - Cholestatic Pattern of elevation with improvement with treatment of infection. Suspect related to underlying sepsis. Acute Hepatitis Panel negative. No risk factors for other liver disorders  # Acute Pyelonephritis    Recommendation:  - Diet as tolerated  - Trend Liver Tests  - Supportive care per primary team    To be seen by attending in the AM    Phoebe Keita MD  Gastroenterology Fellow  [phone]  Please page on call fellow on weekends and after 5pm on weekdays

## 2019-06-22 NOTE — CONSULT NOTE ADULT - ASSESSMENT
75 yo F hx of PsA (recently started on cosentyx 3 weeks ago), HTN, HLD, DM p/w fevers.    -    Marek Pimentel MD  Rheumatology Fellow PGY IV 73 yo F hx of PsA (recently started on cosentyx 3 weeks ago), HTN, HLD, DM p/w fevers.  Acute pyelonephritis, on antibiotics.  Hold cosentyx until outpatient f/u, infection cleared.  Monitor creatinine while on celecoxib, if Cr worsens would use tylenol and celecoxib as breakthrough.  Will sign off please call with questions.    Marek Pimentel MD  Rheumatology Fellow PGY IV 73 yo F hx of PsA (recently started on cosentyx 3 weeks ago), HTN, HLD, DM p/w fevers.  Workup c/w Acute pyelonephritis now on antibiotics.    #Psoriatic Arthritis - currently inactive skin disease and joint exam with chronic changes.  -Hold cosentyx until outpatient f/u and infection cleared.    #Pain management - currently controlled on NSAID  -Monitor creatinine while on celecoxib, given current pyelo  - if Cr worsens would switch to tylenol and celecoxib as breakthrough.      Will sign off please call with questions.    Marek Pimentel MD  Rheumatology Fellow PGY IV

## 2019-06-22 NOTE — PROVIDER CONTACT NOTE (MEDICATION) - SITUATION
pt not ordered for Diabetic diet, lunch tray delivered and pt ate tray before fingerstick performed, pt  and pt and family now refusing any insulin

## 2019-06-22 NOTE — PROGRESS NOTE ADULT - SUBJECTIVE AND OBJECTIVE BOX
INTERVAL HPI/OVERNIGHT EVENTS: I feel better . Grand son and  in room.   Vital Signs Last 24 Hrs  T(C): 36.9 (2019 05:48), Max: 37.3 (2019 13:10)  T(F): 98.5 (2019 05:48), Max: 99.2 (2019 13:10)  HR: 99 (2019 05:48) (78 - 99)  BP: 144/80 (2019 05:48) (122/74 - 144/80)  BP(mean): 88 (2019 18:57) (88 - 88)  RR: 18 (2019 05:48) (18 - 20)  SpO2: 95% (2019 05:48) (95% - 99%)  I&O's Summary    2019 07:01  -  2019 07:00  --------------------------------------------------------  IN: 280 mL / OUT: 0 mL / NET: 280 mL    2019 07:01  -  2019 12:29  --------------------------------------------------------  IN: 120 mL / OUT: 0 mL / NET: 120 mL      MEDICATIONS  (STANDING):  amLODIPine   Tablet 5 milliGRAM(s) Oral daily  cefTRIAXone   IVPB 1000 milliGRAM(s) IV Intermittent every 24 hours  celecoxib 200 milliGRAM(s) Oral two times a day  dextrose 5%. 1000 milliLiter(s) (50 mL/Hr) IV Continuous <Continuous>  dextrose 50% Injectable 12.5 Gram(s) IV Push once  dextrose 50% Injectable 25 Gram(s) IV Push once  dextrose 50% Injectable 25 Gram(s) IV Push once  enoxaparin Injectable 40 milliGRAM(s) SubCutaneous daily  insulin lispro (HumaLOG) corrective regimen sliding scale   SubCutaneous three times a day before meals  insulin lispro (HumaLOG) corrective regimen sliding scale   SubCutaneous at bedtime  melatonin 1 milliGRAM(s) Oral at bedtime  potassium chloride    Tablet ER 40 milliEquivalent(s) Oral every 4 hours  potassium chloride  10 mEq/100 mL IVPB 10 milliEquivalent(s) IV Intermittent once    MEDICATIONS  (PRN):  dextrose 40% Gel 15 Gram(s) Oral once PRN Blood Glucose LESS THAN 70 milliGRAM(s)/deciliter  glucagon  Injectable 1 milliGRAM(s) IntraMuscular once PRN Glucose LESS THAN 70 milligrams/deciliter    LABS:                        15.5   14.9  )-----------( 319      ( 2019 12:17 )             46.7     -    139  |  97  |  9   ----------------------------<  120<H>  3.0<L>   |  24  |  0.63    Ca    9.3      2019 06:33  Phos  4.0       Mg     1.8         TPro  6.7  /  Alb  2.6<L>  /  TBili  1.0  /  DBili  0.3<H>  /  AST  101<H>  /  ALT  192<H>  /  AlkPhos  216<H>      PT/INR - ( 2019 19:32 )   PT: 13.4 sec;   INR: 1.17 ratio         PTT - ( 2019 19:32 )  PTT:30.8 sec  Urinalysis Basic - ( 2019 14:30 )    Color: Yellow / Appearance: Slightly Turbid / S.009 / pH: x  Gluc: x / Ketone: Negative  / Bili: Negative / Urobili: 6 mg/dL   Blood: x / Protein: Trace / Nitrite: Negative   Leuk Esterase: Large / RBC: 4 /hpf / WBC 33 /HPF   Sq Epi: x / Non Sq Epi: 1 /hpf / Bacteria: Few      CAPILLARY BLOOD GLUCOSE      POCT Blood Glucose.: 123 mg/dL (2019 09:36)  POCT Blood Glucose.: 123 mg/dL (2019 22:52)        Urinalysis Basic - ( 2019 14:30 )    Color: Yellow / Appearance: Slightly Turbid / S.009 / pH: x  Gluc: x / Ketone: Negative  / Bili: Negative / Urobili: 6 mg/dL   Blood: x / Protein: Trace / Nitrite: Negative   Leuk Esterase: Large / RBC: 4 /hpf / WBC 33 /HPF   Sq Epi: x / Non Sq Epi: 1 /hpf / Bacteria: Few      REVIEW OF SYSTEMS:  CONSTITUTIONAL: No fever, weight loss, or fatigue  EYES: No eye pain, visual disturbances, or discharge  ENMT:  No difficulty hearing, tinnitus, vertigo; No sinus or throat pain  NECK: No pain or stiffness  CARDIOVASCULAR: No chest pain, palpitations, dizziness, or leg swelling  GASTROINTESTINAL: No abdominal or epigastric pain. No nausea, vomiting, or hematemesis; No diarrhea or constipation. No melena or hematochezia.  GENITOURINARY: No dysuria, frequency, hematuria, or incontinence  NEUROLOGICAL: No headaches, memory loss, loss of strength, numbness, or tremors      Consultant(s) Notes Reviewed:  [x ] YES  [ ] NO    PHYSICAL EXAM:  GENERAL: NAD, well-groomed, well-developed,not in any distress ,  HEAD:  Atraumatic, Normocephalic  EYES: EOMI, PERRLA, conjunctiva and sclera clear  ENMT: No tonsillar erythema, exudates, or enlargement; Moist mucous membranes, Good dentition, No lesions  NECK: Supple, No JVD, Normal thyroid  NERVOUS SYSTEM:  Alert & Oriented X3, No focal deficit   CHEST/LUNG: Good air entry bilateral with no  rales, rhonchi, wheezing, or rubs  HEART: Regular rate and rhythm; No murmurs, rubs, or gallops  ABDOMEN: Soft, Nontender, Nondistended; Bowel sounds present  EXTREMITIES:  2+ Peripheral Pulses, No clubbing, cyanosis, or edema    Care Discussed with Consultants/Other Providers [ x] YES  [ ] NO

## 2019-06-22 NOTE — CONSULT NOTE ADULT - SUBJECTIVE AND OBJECTIVE BOX
Chief Complaint:  Patient is a 74y old  Female who presents with a chief complaint of weakness and frequency of urination 1 week (2019 12:29)    HPI:  74 year old female with hx of Hypertension, hyperlipidemia, DM, cholecystectomy, psoriatic arthritis (recently started on Cosentyx 3 weeks ago) presents with weakness for one week associated with decreased PO intake and difficulty ambulating. She went to her PCP, who found her to be febrile to 101.3 with abnormal labs (family recalls low potassium) prompting them to send her to the ED. She denies nausea, vomiting, diarrhea, weight loss, cough, headache, melena, hematochezia, hematemesis, recent hospitalization, chest pain, dysuria, recent travel and history of liver problems. She was diagnosed with pyelonephritis in the ED and started on Abx with improvement of symptoms.     Allergies:  No Known Allergies    Home Medications:  Reviewed    Hospital Medications:  amLODIPine   Tablet 5 milliGRAM(s) Oral daily  cefTRIAXone   IVPB 1000 milliGRAM(s) IV Intermittent every 24 hours  celecoxib 200 milliGRAM(s) Oral two times a day  dextrose 40% Gel 15 Gram(s) Oral once PRN  dextrose 5%. 1000 milliLiter(s) IV Continuous <Continuous>  dextrose 50% Injectable 12.5 Gram(s) IV Push once  dextrose 50% Injectable 25 Gram(s) IV Push once  dextrose 50% Injectable 25 Gram(s) IV Push once  enoxaparin Injectable 40 milliGRAM(s) SubCutaneous daily  glucagon  Injectable 1 milliGRAM(s) IntraMuscular once PRN  insulin lispro (HumaLOG) corrective regimen sliding scale   SubCutaneous three times a day before meals  insulin lispro (HumaLOG) corrective regimen sliding scale   SubCutaneous at bedtime  melatonin 1 milliGRAM(s) Oral at bedtime  potassium chloride    Tablet ER 40 milliEquivalent(s) Oral every 4 hours    PMHX/PSHX:  HTN (hypertension)  Psoriatic arthritis    Family history:    Denies family history of PUD, IBD, colon cancer/polyps, stomach cancer/polyps, pancreatic cancer/masses, liver cancer/disease, breast/ovarian cancer and endometrial cancer.     Social History:   No history of tobacco use, EtOH use or illicit drug use     ROS:   General:  No fevers, chills  ENT:  No sore throat or dysphagia  CV:  No pain or palpitations  Resp:  No dyspnea, cough, wheezing  GI:  No pain, No nausea, No vomiting, No diarrhea, No rectal bleeding, No tarry stools  Skin:  No rash or edema    PHYSICAL EXAM:   GENERAL:  NAD, Appears stated age  HEENT:  NC/AT,  conjunctivae clear and pink, sclera -anicteric  CHEST:  CTA B/L, Normal effort  HEART:  RRR S1/S2, No murmurs  ABDOMEN:  Soft, non-tender, non-distended, BS+  EXTEREMITIES:  No cyanosis  SKIN:  Warm & Dry.   NEURO:  Alert, oriented    Vital Signs:  Vital Signs Last 24 Hrs  T(C): 36.5 (2019 13:32), Max: 37.3 (2019 16:52)  T(F): 97.7 (2019 13:32), Max: 99.1 (2019 16:52)  HR: 86 (2019 13:32) (78 - 99)  BP: 111/73 (2019 13:32) (111/73 - 144/80)  BP(mean): 88 (2019 18:57) (88 - 88)  RR: 18 (2019 13:32) (18 - 20)  SpO2: 94% (2019 13:32) (94% - 99%)  Daily     Daily     LABS:                        14.3   14.23 )-----------( 358      ( 2019 12:03 )             43.3     Mean Cell Volume: 85.1 fl (-19 @ 12:03)        139  |  97  |  9   ----------------------------<  120<H>  3.0<L>   |  24  |  0.63    Ca    9.3      2019 06:33  Phos  4.0       Mg     1.8         TPro  6.7  /  Alb  2.6<L>  /  TBili  1.0  /  DBili  0.3<H>  /  AST  101<H>  /  ALT  192<H>  /  AlkPhos  216<H>      LIVER FUNCTIONS - ( 2019 06:33 )  Alb: 2.6 g/dL / Pro: 6.7 g/dL / ALK PHOS: 216 U/L / ALT: 192 U/L / AST: 101 U/L / GGT: x           PT/INR - ( 2019 19:32 )   PT: 13.4 sec;   INR: 1.17 ratio         PTT - ( 2019 19:32 )  PTT:30.8 sec  Urinalysis Basic - ( 2019 14:30 )    Color: Yellow / Appearance: Slightly Turbid / S.009 / pH: x  Gluc: x / Ketone: Negative  / Bili: Negative / Urobili: 6 mg/dL   Blood: x / Protein: Trace / Nitrite: Negative   Leuk Esterase: Large / RBC: 4 /hpf / WBC 33 /HPF   Sq Epi: x / Non Sq Epi: 1 /hpf / Bacteria: Few                        14.3   14.23 )-----------( 358      ( 2019 12:03 )             43.3                         15.5   14.9  )-----------( 319      ( 2019 12:17 )             46.7     Imaging:

## 2019-06-23 LAB
ALBUMIN SERPL ELPH-MCNC: 2.5 G/DL — LOW (ref 3.3–5)
ALP SERPL-CCNC: 169 U/L — HIGH (ref 40–120)
ALT FLD-CCNC: 113 U/L — HIGH (ref 10–45)
ANION GAP SERPL CALC-SCNC: 14 MMOL/L — SIGNIFICANT CHANGE UP (ref 5–17)
ANION GAP SERPL CALC-SCNC: 16 MMOL/L — SIGNIFICANT CHANGE UP (ref 5–17)
AST SERPL-CCNC: 25 U/L — SIGNIFICANT CHANGE UP (ref 10–40)
BASOPHILS # BLD AUTO: 0 K/UL — SIGNIFICANT CHANGE UP (ref 0–0.2)
BASOPHILS NFR BLD AUTO: 0 % — SIGNIFICANT CHANGE UP (ref 0–2)
BILIRUB SERPL-MCNC: 0.5 MG/DL — SIGNIFICANT CHANGE UP (ref 0.2–1.2)
BUN SERPL-MCNC: 16 MG/DL — SIGNIFICANT CHANGE UP (ref 7–23)
BUN SERPL-MCNC: 17 MG/DL — SIGNIFICANT CHANGE UP (ref 7–23)
CALCIUM SERPL-MCNC: 9.2 MG/DL — SIGNIFICANT CHANGE UP (ref 8.4–10.5)
CALCIUM SERPL-MCNC: 9.5 MG/DL — SIGNIFICANT CHANGE UP (ref 8.4–10.5)
CHLORIDE SERPL-SCNC: 101 MMOL/L — SIGNIFICANT CHANGE UP (ref 96–108)
CHLORIDE SERPL-SCNC: 101 MMOL/L — SIGNIFICANT CHANGE UP (ref 96–108)
CO2 SERPL-SCNC: 24 MMOL/L — SIGNIFICANT CHANGE UP (ref 22–31)
CO2 SERPL-SCNC: 25 MMOL/L — SIGNIFICANT CHANGE UP (ref 22–31)
CREAT SERPL-MCNC: 0.63 MG/DL — SIGNIFICANT CHANGE UP (ref 0.5–1.3)
CREAT SERPL-MCNC: 0.65 MG/DL — SIGNIFICANT CHANGE UP (ref 0.5–1.3)
EOSINOPHIL # BLD AUTO: 0.08 K/UL — SIGNIFICANT CHANGE UP (ref 0–0.5)
EOSINOPHIL NFR BLD AUTO: 0.9 % — SIGNIFICANT CHANGE UP (ref 0–6)
GLUCOSE BLDC GLUCOMTR-MCNC: 115 MG/DL — HIGH (ref 70–99)
GLUCOSE BLDC GLUCOMTR-MCNC: 132 MG/DL — HIGH (ref 70–99)
GLUCOSE BLDC GLUCOMTR-MCNC: 138 MG/DL — HIGH (ref 70–99)
GLUCOSE BLDC GLUCOMTR-MCNC: 222 MG/DL — HIGH (ref 70–99)
GLUCOSE SERPL-MCNC: 148 MG/DL — HIGH (ref 70–99)
GLUCOSE SERPL-MCNC: 153 MG/DL — HIGH (ref 70–99)
HCT VFR BLD CALC: 43.7 % — SIGNIFICANT CHANGE UP (ref 34.5–45)
HCT VFR BLD CALC: 44.1 % — SIGNIFICANT CHANGE UP (ref 34.5–45)
HGB BLD-MCNC: 14.1 G/DL — SIGNIFICANT CHANGE UP (ref 11.5–15.5)
HGB BLD-MCNC: 14.4 G/DL — SIGNIFICANT CHANGE UP (ref 11.5–15.5)
LYMPHOCYTES # BLD AUTO: 2.17 K/UL — SIGNIFICANT CHANGE UP (ref 1–3.3)
LYMPHOCYTES # BLD AUTO: 24.5 % — SIGNIFICANT CHANGE UP (ref 13–44)
MAGNESIUM SERPL-MCNC: 1.9 MG/DL — SIGNIFICANT CHANGE UP (ref 1.6–2.6)
MANUAL SMEAR VERIFICATION: SIGNIFICANT CHANGE UP
MCHC RBC-ENTMCNC: 27.8 PG — SIGNIFICANT CHANGE UP (ref 27–34)
MCHC RBC-ENTMCNC: 28.1 PG — SIGNIFICANT CHANGE UP (ref 27–34)
MCHC RBC-ENTMCNC: 32 GM/DL — SIGNIFICANT CHANGE UP (ref 32–36)
MCHC RBC-ENTMCNC: 33 GM/DL — SIGNIFICANT CHANGE UP (ref 32–36)
MCV RBC AUTO: 85.4 FL — SIGNIFICANT CHANGE UP (ref 80–100)
MCV RBC AUTO: 86.8 FL — SIGNIFICANT CHANGE UP (ref 80–100)
MONOCYTES # BLD AUTO: 1.24 K/UL — HIGH (ref 0–0.9)
MONOCYTES NFR BLD AUTO: 14 % — SIGNIFICANT CHANGE UP (ref 2–14)
NEUTROPHILS # BLD AUTO: 5.28 K/UL — SIGNIFICANT CHANGE UP (ref 1.8–7.4)
NEUTROPHILS NFR BLD AUTO: 58.8 % — SIGNIFICANT CHANGE UP (ref 43–77)
NEUTS BAND # BLD: 0.9 % — SIGNIFICANT CHANGE UP (ref 0–8)
PLAT MORPH BLD: NORMAL — SIGNIFICANT CHANGE UP
PLATELET # BLD AUTO: 394 K/UL — SIGNIFICANT CHANGE UP (ref 150–400)
PLATELET # BLD AUTO: 396 K/UL — SIGNIFICANT CHANGE UP (ref 150–400)
POTASSIUM SERPL-MCNC: 3.5 MMOL/L — SIGNIFICANT CHANGE UP (ref 3.5–5.3)
POTASSIUM SERPL-MCNC: 3.5 MMOL/L — SIGNIFICANT CHANGE UP (ref 3.5–5.3)
POTASSIUM SERPL-SCNC: 3.5 MMOL/L — SIGNIFICANT CHANGE UP (ref 3.5–5.3)
POTASSIUM SERPL-SCNC: 3.5 MMOL/L — SIGNIFICANT CHANGE UP (ref 3.5–5.3)
PROT SERPL-MCNC: 6.6 G/DL — SIGNIFICANT CHANGE UP (ref 6–8.3)
RBC # BLD: 5.08 M/UL — SIGNIFICANT CHANGE UP (ref 3.8–5.2)
RBC # BLD: 5.12 M/UL — SIGNIFICANT CHANGE UP (ref 3.8–5.2)
RBC # FLD: 14 % — SIGNIFICANT CHANGE UP (ref 10.3–14.5)
RBC # FLD: 14.2 % — SIGNIFICANT CHANGE UP (ref 10.3–14.5)
RBC BLD AUTO: NORMAL — SIGNIFICANT CHANGE UP
SODIUM SERPL-SCNC: 140 MMOL/L — SIGNIFICANT CHANGE UP (ref 135–145)
SODIUM SERPL-SCNC: 141 MMOL/L — SIGNIFICANT CHANGE UP (ref 135–145)
VARIANT LYMPHS # BLD: 0.9 % — SIGNIFICANT CHANGE UP (ref 0–6)
WBC # BLD: 8.83 K/UL — SIGNIFICANT CHANGE UP (ref 3.8–10.5)
WBC # BLD: 8.85 K/UL — SIGNIFICANT CHANGE UP (ref 3.8–10.5)
WBC # FLD AUTO: 8.83 K/UL — SIGNIFICANT CHANGE UP (ref 3.8–10.5)
WBC # FLD AUTO: 8.85 K/UL — SIGNIFICANT CHANGE UP (ref 3.8–10.5)

## 2019-06-23 PROCEDURE — 99232 SBSQ HOSP IP/OBS MODERATE 35: CPT | Mod: GC

## 2019-06-23 RX ORDER — LANOLIN ALCOHOL/MO/W.PET/CERES
3 CREAM (GRAM) TOPICAL AT BEDTIME
Refills: 0 | Status: DISCONTINUED | OUTPATIENT
Start: 2019-06-23 | End: 2019-06-24

## 2019-06-23 RX ADMIN — Medication 2: at 13:21

## 2019-06-23 RX ADMIN — CELECOXIB 200 MILLIGRAM(S): 200 CAPSULE ORAL at 05:46

## 2019-06-23 RX ADMIN — CEFTRIAXONE 100 MILLIGRAM(S): 500 INJECTION, POWDER, FOR SOLUTION INTRAMUSCULAR; INTRAVENOUS at 13:58

## 2019-06-23 RX ADMIN — CELECOXIB 200 MILLIGRAM(S): 200 CAPSULE ORAL at 17:30

## 2019-06-23 RX ADMIN — Medication 3 MILLIGRAM(S): at 21:15

## 2019-06-23 RX ADMIN — ENOXAPARIN SODIUM 40 MILLIGRAM(S): 100 INJECTION SUBCUTANEOUS at 13:59

## 2019-06-23 RX ADMIN — AMLODIPINE BESYLATE 5 MILLIGRAM(S): 2.5 TABLET ORAL at 05:46

## 2019-06-23 NOTE — PHYSICAL THERAPY INITIAL EVALUATION ADULT - PERTINENT HX OF CURRENT PROBLEM, REHAB EVAL
75 y/o F, with PMH HTN, HLD, DM, psoriatic arthritis, s/p cholecystectomy. Pt a/w weakness x 1 wk, difficulty ambulating, decreased PO & fever. CT Abdomen 6/21: Findings most consistent with left pyelonephritis; correlate with urinalysis. (-) bowel obstruction. CXR 6/21: Poor inspiratory effort. The heart is normal in size. The lungs are clear.

## 2019-06-23 NOTE — PROGRESS NOTE ADULT - SUBJECTIVE AND OBJECTIVE BOX
Chief Complaint:  Patient is a 74y old  Female who presents with a chief complaint of weakness and frequency of urination 1 week (2019 15:12)    Interval Events:   No acute overnight events. Patient denies any specific complaints.     Allergies:  No Known Allergies    Hospital Medications:  amLODIPine   Tablet 5 milliGRAM(s) Oral daily  cefTRIAXone   IVPB 1000 milliGRAM(s) IV Intermittent every 24 hours  celecoxib 200 milliGRAM(s) Oral two times a day  dextrose 40% Gel 15 Gram(s) Oral once PRN  dextrose 5%. 1000 milliLiter(s) IV Continuous <Continuous>  dextrose 50% Injectable 12.5 Gram(s) IV Push once  dextrose 50% Injectable 25 Gram(s) IV Push once  dextrose 50% Injectable 25 Gram(s) IV Push once  enoxaparin Injectable 40 milliGRAM(s) SubCutaneous daily  glucagon  Injectable 1 milliGRAM(s) IntraMuscular once PRN  insulin lispro (HumaLOG) corrective regimen sliding scale   SubCutaneous three times a day before meals  insulin lispro (HumaLOG) corrective regimen sliding scale   SubCutaneous at bedtime  melatonin 1 milliGRAM(s) Oral at bedtime    PMHX/PSHX:  HTN (hypertension)  Psoriatic arthritis    ROS:   General:  No fevers or chills  ENT:  No sore throat or dysphagia  CV:  No pain or palpitations  Resp:  No dyspnea, cough or  wheezing  GI:  No pain, No nausea, No vomiting, No rectal bleeding, No tarry stools,  Skin:  No rash or edema    PHYSICAL EXAM:   Vital Signs:  Vital Signs Last 24 Hrs  T(C): 36.6 (2019 05:45), Max: 37.6 (2019 21:03)  T(F): 97.9 (2019 05:45), Max: 99.7 (2019 21:03)  HR: 82 (2019 05:45) (78 - 86)  BP: 139/79 (2019 05:45) (105/70 - 139/79)  BP(mean): --  RR: 18 (2019 05:45) (18 - 18)  SpO2: 95% (2019 05:45) (93% - 95%)  Daily     Daily     GENERAL:  NAD, Appears stated age  HEENT:  NC/AT,  conjunctivae clear and pink, sclera -anicteric  CHEST:  Normal Effort, Breath sounds clear  HEART:  RRR, S1 + S2, no murmurs  ABDOMEN:  Soft, non-tender, non-distended, BS+  EXTEREMITIES:  no cyanosis  SKIN:  Warm & Dry.  NEURO:  Alert, oriented    LABS:                        14.4   8.83  )-----------( 396      ( 2019 08:48 )             43.7     Mean Cell Volume: 85.4 fl (- @ 08:48)        140  |  101  |  17  ----------------------------<  153<H>  3.5   |  25  |  0.63    Ca    9.2      2019 06:24  Phos  4.0       Mg     1.9         TPro  6.6  /  Alb  2.5<L>  /  TBili  0.5  /  DBili  x   /  AST  25  /  ALT  113<H>  /  AlkPhos  169<H>      LIVER FUNCTIONS - ( 2019 06:24 )  Alb: 2.5 g/dL / Pro: 6.6 g/dL / ALK PHOS: 169 U/L / ALT: 113 U/L / AST: 25 U/L / GGT: x           PT/INR - ( 2019 19:32 )   PT: 13.4 sec;   INR: 1.17 ratio         PTT - ( 2019 19:32 )  PTT:30.8 sec  Urinalysis Basic - ( 2019 14:30 )    Color: Yellow / Appearance: Slightly Turbid / S.009 / pH: x  Gluc: x / Ketone: Negative  / Bili: Negative / Urobili: 6 mg/dL   Blood: x / Protein: Trace / Nitrite: Negative   Leuk Esterase: Large / RBC: 4 /hpf / WBC 33 /HPF   Sq Epi: x / Non Sq Epi: 1 /hpf / Bacteria: Few                        14.4   8.83  )-----------( 396      ( 2019 08:48 )             43.7                         14.1   8.85  )-----------( 394      ( 2019 08:40 )             44.1                         14.3   14.23 )-----------( 358      ( 2019 12:03 )             43.3                         15.5   14.9  )-----------( 319      ( 2019 12:17 )             46.7       Imaging:

## 2019-06-23 NOTE — PROGRESS NOTE ADULT - ATTENDING COMMENTS
Patient seen and examined with the GI fellow. I agree with the above assessment and plan. Thank you for allowing us to care for your patient.

## 2019-06-23 NOTE — PROGRESS NOTE ADULT - ASSESSMENT
74F w/ pmh HTN, HLD, DM, psoriatic arthritis (on cosentyx, last injection last wk) s/p cholecystectomy - sent by pcp bc abn labs - has had weakness x 1 wk, difficulty ambulating, decreased PO, fever yesterday 101.3 but not today, didn't take fever meds today. No recent travel, medication change, illness, or hospitalization. No cough, chest pain, dysuria. +frequency.      Problem/Plan - 1:  ·  Problem: Acute pyelonephritis with UTI ; Plan: S/P Urine and blood cultures. I AV Abxs  Rocephin started in ED  and will continue.      Problem/Plan - 2:  ·  Problem: LFTs abnormal.  Plan: CT abdomen noted. US pending. S/P Cholecystectomy. On Cosentyx . House Hepatology consulted and recommending MRCP .      Problem/Plan - 3:  ·  Problem: HTN (hypertension).  Plan: BP meds with hold parameters. BP readings fine.      Problem/Plan - 4:  ·  Problem: Psoriatic arthritis.  Plan: S/P Cosentyx and on Celebrex. Rheumatology consult noted.      Problem/Plan - 5:  ·  Problem: Hypokalemia.  Plan: Replacing.

## 2019-06-23 NOTE — PROGRESS NOTE ADULT - SUBJECTIVE AND OBJECTIVE BOX
INTERVAL HPI/OVERNIGHT EVENTS: I feel weak.   Vital Signs Last 24 Hrs  T(C): 36.6 (2019 05:45), Max: 37.6 (2019 21:03)  T(F): 97.9 (2019 05:45), Max: 99.7 (2019 21:03)  HR: 89 (2019 09:17) (78 - 89)  BP: 117/76 (2019 09:17) (105/70 - 139/79)  BP(mean): --  RR: 18 (2019 05:45) (18 - 18)  SpO2: 96% (2019 09:17) (93% - 96%)  I&O's Summary    2019 07:  -  2019 07:00  --------------------------------------------------------  IN: 720 mL / OUT: 0 mL / NET: 720 mL    2019 07:01  -  2019 14:13  --------------------------------------------------------  IN: 240 mL / OUT: 0 mL / NET: 240 mL      MEDICATIONS  (STANDING):  amLODIPine   Tablet 5 milliGRAM(s) Oral daily  cefTRIAXone   IVPB 1000 milliGRAM(s) IV Intermittent every 24 hours  celecoxib 200 milliGRAM(s) Oral two times a day  dextrose 5%. 1000 milliLiter(s) (50 mL/Hr) IV Continuous <Continuous>  dextrose 50% Injectable 12.5 Gram(s) IV Push once  dextrose 50% Injectable 25 Gram(s) IV Push once  dextrose 50% Injectable 25 Gram(s) IV Push once  enoxaparin Injectable 40 milliGRAM(s) SubCutaneous daily  insulin lispro (HumaLOG) corrective regimen sliding scale   SubCutaneous three times a day before meals  insulin lispro (HumaLOG) corrective regimen sliding scale   SubCutaneous at bedtime  melatonin 1 milliGRAM(s) Oral at bedtime    MEDICATIONS  (PRN):  dextrose 40% Gel 15 Gram(s) Oral once PRN Blood Glucose LESS THAN 70 milliGRAM(s)/deciliter  glucagon  Injectable 1 milliGRAM(s) IntraMuscular once PRN Glucose LESS THAN 70 milligrams/deciliter    LABS:                        14.4   8.83  )-----------( 396      ( 2019 08:48 )             43.7     06-23    140  |  101  |  17  ----------------------------<  153<H>  3.5   |  25  |  0.63    Ca    9.2      2019 06:24  Phos  4.0     06-  Mg     1.9         TPro  6.6  /  Alb  2.5<L>  /  TBili  0.5  /  DBili  x   /  AST  25  /  ALT  113<H>  /  AlkPhos  169<H>      PT/INR - ( 2019 19:32 )   PT: 13.4 sec;   INR: 1.17 ratio         PTT - ( 2019 19:32 )  PTT:30.8 sec  Urinalysis Basic - ( 2019 14:30 )    Color: Yellow / Appearance: Slightly Turbid / S.009 / pH: x  Gluc: x / Ketone: Negative  / Bili: Negative / Urobili: 6 mg/dL   Blood: x / Protein: Trace / Nitrite: Negative   Leuk Esterase: Large / RBC: 4 /hpf / WBC 33 /HPF   Sq Epi: x / Non Sq Epi: 1 /hpf / Bacteria: Few      CAPILLARY BLOOD GLUCOSE      POCT Blood Glucose.: 222 mg/dL (2019 13:12)  POCT Blood Glucose.: 138 mg/dL (2019 08:50)  POCT Blood Glucose.: 155 mg/dL (2019 21:44)  POCT Blood Glucose.: 172 mg/dL (2019 17:53)        Urinalysis Basic - ( 2019 14:30 )    Color: Yellow / Appearance: Slightly Turbid / S.009 / pH: x  Gluc: x / Ketone: Negative  / Bili: Negative / Urobili: 6 mg/dL   Blood: x / Protein: Trace / Nitrite: Negative   Leuk Esterase: Large / RBC: 4 /hpf / WBC 33 /HPF   Sq Epi: x / Non Sq Epi: 1 /hpf / Bacteria: Few      REVIEW OF SYSTEMS:  CONSTITUTIONAL: No fever, weight loss, or fatigue  EYES: No eye pain, visual disturbances, or discharge  ENMT:  No difficulty hearing, tinnitus, vertigo; No sinus or throat pain  NECK: No pain or stiffness  RESPIRATORY: No cough, wheezing, chills or hemoptysis; No shortness of breath  CARDIOVASCULAR: No chest pain, palpitations, dizziness, or leg swelling  GASTROINTESTINAL: No abdominal or epigastric pain. No nausea, vomiting, or hematemesis; No diarrhea or constipation. No melena or hematochezia.  GENITOURINARY: No dysuria, frequency, hematuria, or incontinence  NEUROLOGICAL: No headaches, memory loss, loss of strength, numbness, or tremors    Consultant(s) Notes Reviewed:  [x ] YES  [ ] NO    PHYSICAL EXAM:  GENERAL: NAD, well-groomed, well-developed,not in any distress ,  HEAD:  Atraumatic, Normocephalic  EYES: EOMI, PERRLA, conjunctiva and sclera clear  ENMT: No tonsillar erythema, exudates, or enlargement; Moist mucous membranes, Good dentition, No lesions  NECK: Supple, No JVD, Normal thyroid  NERVOUS SYSTEM:  Alert & Oriented X3, No focal deficit   CHEST/LUNG: Good air entry bilateral with no  rales, rhonchi, wheezing, or rubs  HEART: Regular rate and rhythm; No murmurs, rubs, or gallops  ABDOMEN: Soft, Nontender, Nondistended; Bowel sounds present  EXTREMITIES:  2+ Peripheral Pulses, No clubbing, cyanosis, or edema    Care Discussed with Consultants/Other Providers [ x] YES  [ ] NO

## 2019-06-23 NOTE — PHYSICAL THERAPY INITIAL EVALUATION ADULT - TRANSFER TRAINING, PT EVAL
GOAL: Pt will perform ALL transfers with CGA/Sup, w/use of appropriate assistive device as needed, in 2 weeks.

## 2019-06-23 NOTE — PHYSICAL THERAPY INITIAL EVALUATION ADULT - ADDITIONAL COMMENTS
Per Daughter Ivis Gonzalez [phone], prior to admission for the past week pt required assist for of all ADL's & functional mobility. Prior to that, pt was independent without AD. Pt has HHA 7 days a week, 5 hours a day. Pt owns RW, cane, raised toilet seat, & grab bars. Pt resides in [facility] [place] with spouse, elevator access.

## 2019-06-23 NOTE — PROGRESS NOTE ADULT - ASSESSMENT
Impression:  # Elevated Liver Tests: - Cholestatic Pattern of elevation with improvement with treatment of infection. Suspect related to underlying sepsis. Acute Hepatitis Panel negative. No risk factors for other liver disorders  # Acute Pyelonephritis  # Dilated CBD. Potentially related to cholecystectomy, but given elevated     Recommendation:  - Diet as tolerated  - Trend Liver Tests  - Obtain MRCP given dilated CBD  - Supportive care per primary team    Phoebe Keita MD  Gastroenterology Fellow  [phone]  Please page on call fellow on weekends and after 5pm on weekdays Impression:  # Elevated Liver Tests: - Cholestatic Pattern of elevation with improvement with treatment of infection. Suspect related to underlying sepsis. Acute Hepatitis Panel negative. No risk factors for other liver disorders  # Acute Pyelonephritis  # Dilated CBD. Potentially related to cholecystectomy, but given elevated     Recommendation:  - Diet as tolerated  - Trend Liver Tests  - Recommended MRCP, but patient and family are refusing at this time. They prefer to do it as outpatient  - Supportive care per primary team    GI/Hepatology will sign off    Phoebe Keita MD  Gastroenterology Fellow  [phone]  Please page on call fellow on weekends and after 5pm on weekdays

## 2019-06-23 NOTE — PHYSICAL THERAPY INITIAL EVALUATION ADULT - STRENGTHENING, PT EVAL
GOAL: Pt will improve bilateral LE strength to 3+/5, for increased limb stability, to improve gait in 2 weeks.

## 2019-06-23 NOTE — PHYSICAL THERAPY INITIAL EVALUATION ADULT - GENERAL OBSERVATIONS, REHAB EVAL
Pt received seated in chair, A&Ox3, Nepalese speaking understand & communicates english, annel 45 min, PT clark.

## 2019-06-24 VITALS
SYSTOLIC BLOOD PRESSURE: 134 MMHG | DIASTOLIC BLOOD PRESSURE: 80 MMHG | RESPIRATION RATE: 16 BRPM | OXYGEN SATURATION: 98 % | TEMPERATURE: 98 F | HEART RATE: 81 BPM

## 2019-06-24 DIAGNOSIS — D72.829 ELEVATED WHITE BLOOD CELL COUNT, UNSPECIFIED: ICD-10-CM

## 2019-06-24 DIAGNOSIS — E11.9 TYPE 2 DIABETES MELLITUS WITHOUT COMPLICATIONS: ICD-10-CM

## 2019-06-24 LAB
A PHAGOCYTOPH IGG TITR SER IF: SIGNIFICANT CHANGE UP TITER
ALBUMIN SERPL ELPH-MCNC: 3 G/DL — LOW (ref 3.3–5)
ALP SERPL-CCNC: 161 U/L — HIGH (ref 40–120)
ALT FLD-CCNC: 82 U/L — HIGH (ref 10–45)
ANION GAP SERPL CALC-SCNC: 16 MMOL/L — SIGNIFICANT CHANGE UP (ref 5–17)
AST SERPL-CCNC: 20 U/L — SIGNIFICANT CHANGE UP (ref 10–40)
B BURGDOR AB SER QL IA: NEGATIVE — SIGNIFICANT CHANGE UP
B MICROTI IGG TITR SER: SIGNIFICANT CHANGE UP TITER
BILIRUB DIRECT SERPL-MCNC: 0.1 MG/DL — SIGNIFICANT CHANGE UP (ref 0–0.2)
BILIRUB SERPL-MCNC: 0.5 MG/DL — SIGNIFICANT CHANGE UP (ref 0.2–1.2)
BUN SERPL-MCNC: 18 MG/DL — SIGNIFICANT CHANGE UP (ref 7–23)
CALCIUM SERPL-MCNC: 9.2 MG/DL — SIGNIFICANT CHANGE UP (ref 8.4–10.5)
CHLORIDE SERPL-SCNC: 98 MMOL/L — SIGNIFICANT CHANGE UP (ref 96–108)
CO2 SERPL-SCNC: 26 MMOL/L — SIGNIFICANT CHANGE UP (ref 22–31)
CREAT SERPL-MCNC: 0.64 MG/DL — SIGNIFICANT CHANGE UP (ref 0.5–1.3)
E CHAFFEENSIS IGG TITR SER IF: SIGNIFICANT CHANGE UP TITER
GLUCOSE BLDC GLUCOMTR-MCNC: 146 MG/DL — HIGH (ref 70–99)
GLUCOSE BLDC GLUCOMTR-MCNC: 200 MG/DL — HIGH (ref 70–99)
GLUCOSE SERPL-MCNC: 152 MG/DL — HIGH (ref 70–99)
MAGNESIUM SERPL-MCNC: 1.9 MG/DL — SIGNIFICANT CHANGE UP (ref 1.6–2.6)
POTASSIUM SERPL-MCNC: 3.4 MMOL/L — LOW (ref 3.5–5.3)
POTASSIUM SERPL-SCNC: 3.4 MMOL/L — LOW (ref 3.5–5.3)
PROT SERPL-MCNC: 7.5 G/DL — SIGNIFICANT CHANGE UP (ref 6–8.3)
SODIUM SERPL-SCNC: 140 MMOL/L — SIGNIFICANT CHANGE UP (ref 135–145)

## 2019-06-24 PROCEDURE — 82435 ASSAY OF BLOOD CHLORIDE: CPT

## 2019-06-24 PROCEDURE — 85610 PROTHROMBIN TIME: CPT

## 2019-06-24 PROCEDURE — 86666 EHRLICHIA ANTIBODY: CPT

## 2019-06-24 PROCEDURE — 86618 LYME DISEASE ANTIBODY: CPT

## 2019-06-24 PROCEDURE — 86663 EPSTEIN-BARR ANTIBODY: CPT

## 2019-06-24 PROCEDURE — 87086 URINE CULTURE/COLONY COUNT: CPT

## 2019-06-24 PROCEDURE — 82947 ASSAY GLUCOSE BLOOD QUANT: CPT

## 2019-06-24 PROCEDURE — 82803 BLOOD GASES ANY COMBINATION: CPT

## 2019-06-24 PROCEDURE — 86753 PROTOZOA ANTIBODY NOS: CPT

## 2019-06-24 PROCEDURE — 82248 BILIRUBIN DIRECT: CPT

## 2019-06-24 PROCEDURE — 86665 EPSTEIN-BARR CAPSID VCA: CPT

## 2019-06-24 PROCEDURE — 51701 INSERT BLADDER CATHETER: CPT

## 2019-06-24 PROCEDURE — 83605 ASSAY OF LACTIC ACID: CPT

## 2019-06-24 PROCEDURE — 83690 ASSAY OF LIPASE: CPT

## 2019-06-24 PROCEDURE — 80076 HEPATIC FUNCTION PANEL: CPT

## 2019-06-24 PROCEDURE — 74177 CT ABD & PELVIS W/CONTRAST: CPT

## 2019-06-24 PROCEDURE — 93005 ELECTROCARDIOGRAM TRACING: CPT | Mod: XU

## 2019-06-24 PROCEDURE — 99223 1ST HOSP IP/OBS HIGH 75: CPT

## 2019-06-24 PROCEDURE — 99285 EMERGENCY DEPT VISIT HI MDM: CPT | Mod: 25

## 2019-06-24 PROCEDURE — 76705 ECHO EXAM OF ABDOMEN: CPT | Mod: 26

## 2019-06-24 PROCEDURE — 82962 GLUCOSE BLOOD TEST: CPT

## 2019-06-24 PROCEDURE — 85730 THROMBOPLASTIN TIME PARTIAL: CPT

## 2019-06-24 PROCEDURE — 85027 COMPLETE CBC AUTOMATED: CPT

## 2019-06-24 PROCEDURE — 83036 HEMOGLOBIN GLYCOSYLATED A1C: CPT

## 2019-06-24 PROCEDURE — 84295 ASSAY OF SERUM SODIUM: CPT

## 2019-06-24 PROCEDURE — 82330 ASSAY OF CALCIUM: CPT

## 2019-06-24 PROCEDURE — 96374 THER/PROPH/DIAG INJ IV PUSH: CPT | Mod: XU

## 2019-06-24 PROCEDURE — 80048 BASIC METABOLIC PNL TOTAL CA: CPT

## 2019-06-24 PROCEDURE — 76705 ECHO EXAM OF ABDOMEN: CPT

## 2019-06-24 PROCEDURE — 81001 URINALYSIS AUTO W/SCOPE: CPT

## 2019-06-24 PROCEDURE — 85014 HEMATOCRIT: CPT

## 2019-06-24 PROCEDURE — 76700 US EXAM ABDOM COMPLETE: CPT

## 2019-06-24 PROCEDURE — 87040 BLOOD CULTURE FOR BACTERIA: CPT

## 2019-06-24 PROCEDURE — 83735 ASSAY OF MAGNESIUM: CPT

## 2019-06-24 PROCEDURE — 80053 COMPREHEN METABOLIC PANEL: CPT

## 2019-06-24 PROCEDURE — 84100 ASSAY OF PHOSPHORUS: CPT

## 2019-06-24 PROCEDURE — 83880 ASSAY OF NATRIURETIC PEPTIDE: CPT

## 2019-06-24 PROCEDURE — 86664 EPSTEIN-BARR NUCLEAR ANTIGEN: CPT

## 2019-06-24 PROCEDURE — 97162 PT EVAL MOD COMPLEX 30 MIN: CPT

## 2019-06-24 PROCEDURE — 84132 ASSAY OF SERUM POTASSIUM: CPT

## 2019-06-24 PROCEDURE — 82550 ASSAY OF CK (CPK): CPT

## 2019-06-24 PROCEDURE — 80074 ACUTE HEPATITIS PANEL: CPT

## 2019-06-24 PROCEDURE — 71045 X-RAY EXAM CHEST 1 VIEW: CPT

## 2019-06-24 PROCEDURE — 76700 US EXAM ABDOM COMPLETE: CPT | Mod: 26

## 2019-06-24 RX ORDER — POTASSIUM CHLORIDE 20 MEQ
40 PACKET (EA) ORAL ONCE
Refills: 0 | Status: COMPLETED | OUTPATIENT
Start: 2019-06-24 | End: 2019-06-24

## 2019-06-24 RX ORDER — METFORMIN HYDROCHLORIDE 850 MG/1
1 TABLET ORAL
Qty: 0 | Refills: 0 | DISCHARGE

## 2019-06-24 RX ORDER — CEFPODOXIME PROXETIL 100 MG
1 TABLET ORAL
Qty: 20 | Refills: 0
Start: 2019-06-24 | End: 2019-07-03

## 2019-06-24 RX ORDER — SECUKINUMAB 150 MG/ML
300 INJECTION SUBCUTANEOUS
Qty: 0 | Refills: 0 | DISCHARGE

## 2019-06-24 RX ADMIN — ENOXAPARIN SODIUM 40 MILLIGRAM(S): 100 INJECTION SUBCUTANEOUS at 13:30

## 2019-06-24 RX ADMIN — Medication 40 MILLIEQUIVALENT(S): at 09:56

## 2019-06-24 RX ADMIN — CELECOXIB 200 MILLIGRAM(S): 200 CAPSULE ORAL at 05:14

## 2019-06-24 RX ADMIN — AMLODIPINE BESYLATE 5 MILLIGRAM(S): 2.5 TABLET ORAL at 05:14

## 2019-06-24 RX ADMIN — CEFTRIAXONE 100 MILLIGRAM(S): 500 INJECTION, POWDER, FOR SOLUTION INTRAMUSCULAR; INTRAVENOUS at 13:31

## 2019-06-24 RX ADMIN — Medication 1: at 13:03

## 2019-06-24 NOTE — DISCHARGE NOTE PROVIDER - NSDCFUADDAPPT_GEN_ALL_CORE_FT
Follow up with your Primary Care Doctor within 1 week.  Follow up with your Rheumatologist within 1-2 weeks.  Follow up with your Gastroenterologists within 1-2 weeks.

## 2019-06-24 NOTE — PROGRESS NOTE ADULT - ASSESSMENT
74F w/ pmh HTN, HLD, DM, psoriatic arthritis (on cosentyx, last injection last wk) s/p cholecystectomy - sent by pcp bc abn labs - has had weakness x 1 wk, difficulty ambulating, decreased PO, fever yesterday 101.3 but not today, didn't take fever meds today. No recent travel, medication change, illness, or hospitalization. No cough, chest pain, dysuria. +frequency.      Problem/Plan - 1:  ·  Problem: Acute pyelonephritis with UTI ; Plan: S/P Urine and blood cultures. I AV Abxs  Rocephin started in ED  and will continue. ID consult noted and Vantin 100mg BID x 10 days.      Problem/Plan - 2:  ·  Problem: LFTs abnormal.  Plan: CT abdomen noted. US pending. S/P Cholecystectomy. On Cosentyx . House Hepatology consulted and recommending MRCP . Patient refusing any tests and want to follow up outpt.      Problem/Plan - 3:  ·  Problem: HTN (hypertension).  Plan: BP meds with hold parameters. BP readings fine.      Problem/Plan - 4:  ·  Problem: Psoriatic arthritis.  Plan: S/P Cosentyx and on Celebrex. Rheumatology consult noted.      Problem/Plan - 5:  ·  Problem: Hypokalemia.  Plan: Replacing.

## 2019-06-24 NOTE — DISCHARGE NOTE PROVIDER - CARE PROVIDER_API CALL
Dr. Marycruz Latham, Primary Care Provider  Phone: (   )    -  Fax: (   )    -  Follow Up Time:     Rosalie Krause)  Internal Medicine; Rheumatology  [address] [address] [place] [address]  Phone: [phone]  Fax: [phone]  Follow Up Time:     Dr. Bismark Oquendo, Gastroenterologist  Phone: (   )    -  Fax: (   )    -  Follow Up Time:     Dr. Marycruz Herzog, Primary Care Doctor  Phone: (   )    -  Fax: (   )    -  Follow Up Time:

## 2019-06-24 NOTE — DISCHARGE NOTE NURSING/CASE MANAGEMENT/SOCIAL WORK - NSDCDPATPORTLINK_GEN_ALL_CORE
You can access the [facility] Patient Portal, offered by [facility], by registering with the following website: [email or web address]

## 2019-06-24 NOTE — CONSULT NOTE ADULT - ATTENDING COMMENTS
Patient seen and examined with the GI fellow. I agree with the above assessment and plan. Thank you for allowing us to care for your patient.    Pls check an MRCP given the dilated CBD on CT and abnormal LFTs.
Jaya Tobias  Attending Physician   Division of Infectious Disease  Pager #[phone]  After 5pm/weekend or no response, call #[phone]

## 2019-06-24 NOTE — DISCHARGE NOTE PROVIDER - NSDCCAREPROVSEEN_GEN_ALL_CORE_FT
[facility] Hepatology, Team  [facility] Medicine, Advance PracticeTeam  Charlotte Diaz

## 2019-06-24 NOTE — CONSULT NOTE ADULT - ASSESSMENT
75 yo F hx of PsA (recently started on cosentyx 3 weeks ago), HTN, HLD, DM p/w fevers.  Workup c/w Acute pyelonephritis, leukocytosis

## 2019-06-24 NOTE — CONSULT NOTE ADULT - PROBLEM SELECTOR RECOMMENDATION 9
-better  -think urine culture was sent after ceftriaxone  -cefpodoxime 100 mg po bid x 10 more days to complete pyelo treatment   -blood cx negative  -on Day 4 of iv abx -better  -think urine culture was sent after ceftriaxone  -cefpodoxime 100 mg po bid x 10 more days to complete pyelo treatment   -blood cx negative  -on Day 4 of iv abx  -potential side effects of abx explained including GI, Cdiff, allergy issues, development of resistance, etc.

## 2019-06-24 NOTE — PROGRESS NOTE ADULT - SUBJECTIVE AND OBJECTIVE BOX
INTERVAL HPI/OVERNIGHT EVENTS:   and grand son in room . I feel great so want to go home. Walked fine.   Vital Signs Last 24 Hrs  T(C): 36.9 (24 Jun 2019 05:12), Max: 36.9 (23 Jun 2019 14:25)  T(F): 98.5 (24 Jun 2019 05:12), Max: 98.5 (24 Jun 2019 05:12)  HR: 81 (24 Jun 2019 05:12) (81 - 92)  BP: 134/80 (24 Jun 2019 05:12) (108/71 - 134/80)  BP(mean): --  RR: 16 (24 Jun 2019 05:12) (16 - 18)  SpO2: 98% (24 Jun 2019 05:12) (95% - 98%)  I&O's Summary    23 Jun 2019 07:01  -  24 Jun 2019 07:00  --------------------------------------------------------  IN: 600 mL / OUT: 0 mL / NET: 600 mL      MEDICATIONS  (STANDING):  amLODIPine   Tablet 5 milliGRAM(s) Oral daily  cefTRIAXone   IVPB 1000 milliGRAM(s) IV Intermittent every 24 hours  celecoxib 200 milliGRAM(s) Oral two times a day  dextrose 5%. 1000 milliLiter(s) (50 mL/Hr) IV Continuous <Continuous>  dextrose 50% Injectable 12.5 Gram(s) IV Push once  dextrose 50% Injectable 25 Gram(s) IV Push once  dextrose 50% Injectable 25 Gram(s) IV Push once  enoxaparin Injectable 40 milliGRAM(s) SubCutaneous daily  insulin lispro (HumaLOG) corrective regimen sliding scale   SubCutaneous three times a day before meals  insulin lispro (HumaLOG) corrective regimen sliding scale   SubCutaneous at bedtime  melatonin 3 milliGRAM(s) Oral at bedtime    MEDICATIONS  (PRN):  dextrose 40% Gel 15 Gram(s) Oral once PRN Blood Glucose LESS THAN 70 milliGRAM(s)/deciliter  glucagon  Injectable 1 milliGRAM(s) IntraMuscular once PRN Glucose LESS THAN 70 milligrams/deciliter    LABS:                        14.4   8.83  )-----------( 396      ( 23 Jun 2019 08:48 )             43.7     06-24    140  |  98  |  18  ----------------------------<  152<H>  3.4<L>   |  26  |  0.64    Ca    9.2      24 Jun 2019 07:01  Mg     1.9     06-24    TPro  7.5  /  Alb  3.0<L>  /  TBili  0.5  /  DBili  0.1  /  AST  20  /  ALT  82<H>  /  AlkPhos  161<H>  06-24        CAPILLARY BLOOD GLUCOSE      POCT Blood Glucose.: 200 mg/dL (24 Jun 2019 12:42)  POCT Blood Glucose.: 146 mg/dL (24 Jun 2019 09:43)  POCT Blood Glucose.: 115 mg/dL (23 Jun 2019 21:06)  POCT Blood Glucose.: 132 mg/dL (23 Jun 2019 17:05)          REVIEW OF SYSTEMS:  CONSTITUTIONAL: No fever, weight loss, or fatigue  EYES: No eye pain, visual disturbances, or discharge  ENMT:  No difficulty hearing, tinnitus, vertigo; No sinus or throat pain  NECK: No pain or stiffness  RESPIRATORY: No cough, wheezing, chills or hemoptysis; No shortness of breath  CARDIOVASCULAR: No chest pain, palpitations, dizziness, or leg swelling  GASTROINTESTINAL: No abdominal or epigastric pain. No nausea, vomiting, or hematemesis; No diarrhea or constipation. No melena or hematochezia.  GENITOURINARY: No dysuria, frequency, hematuria, or incontinence  NEUROLOGICAL: No headaches, memory loss, loss of strength, numbness, or tremors    Consultant(s) Notes Reviewed:  [x ] YES  [ ] NO    PHYSICAL EXAM:  GENERAL: NAD, well-groomed, well-developed,not in any distress ,  HEAD:  Atraumatic, Normocephalic  EYES: EOMI, PERRLA, conjunctiva and sclera clear  ENMT: No tonsillar erythema, exudates, or enlargement; Moist mucous membranes, Good dentition, No lesions  NECK: Supple, No JVD, Normal thyroid  NERVOUS SYSTEM:  Alert & Oriented X3, No focal deficit   CHEST/LUNG: Good air entry bilateral with no  rales, rhonchi, wheezing, or rubs  HEART: Regular rate and rhythm; No murmurs, rubs, or gallops  ABDOMEN: Soft, Nontender, Nondistended; Bowel sounds present  EXTREMITIES:  2+ Peripheral Pulses, No clubbing, cyanosis, or edema    Care Discussed with Consultants/Other Providers [ x] YES  [ ] NO

## 2019-06-24 NOTE — CONSULT NOTE ADULT - SUBJECTIVE AND OBJECTIVE BOX
ED DEAL 74y Female  MRN-[id number]    Patient is a 74y old  Female who presents with a chief complaint of weakness and frequency of urination 1 week (23 Jun 2019 09:13)      HPI:  74F w/ pmh HTN, HLD, DM, psoriatic arthritis (on cosentyx, last injection last wk) s/p cholecystectomy - sent by pcp bc abn labs - has had weakness x 1 wk, difficulty ambulating, decreased PO, fever yesterday 101.3 but not today, didn't take fever meds today. No recent travel, medication change, illness, or hospitalization. No cough, chest pain, dysuria. +frequency. (21 Jun 2019 18:57)    Pt feels much better. No fever and urinating normal. Denies kidney stones or prior UTI    Grandson at bedside helped with translation      PAST MEDICAL & SURGICAL HISTORY:  HTN (hypertension)  Psoriatic arthritis      Allergies    No Known Allergies    Intolerances        ANTIMICROBIALS:  cefTRIAXone   IVPB 1000 every 24 hours      MEDICATIONS  (STANDING):  amLODIPine   Tablet 5 milliGRAM(s) Oral daily  cefTRIAXone   IVPB 1000 milliGRAM(s) IV Intermittent every 24 hours  celecoxib 200 milliGRAM(s) Oral two times a day  dextrose 5%. 1000 milliLiter(s) (50 mL/Hr) IV Continuous <Continuous>  dextrose 50% Injectable 12.5 Gram(s) IV Push once  dextrose 50% Injectable 25 Gram(s) IV Push once  dextrose 50% Injectable 25 Gram(s) IV Push once  enoxaparin Injectable 40 milliGRAM(s) SubCutaneous daily  insulin lispro (HumaLOG) corrective regimen sliding scale   SubCutaneous three times a day before meals  insulin lispro (HumaLOG) corrective regimen sliding scale   SubCutaneous at bedtime  melatonin 3 milliGRAM(s) Oral at bedtime      Social History  Smoking: no  Etoh: no  Drug use: no      FAMILY HISTORY: No pertinent family hx in relation to chief complaint       Vital Signs Last 24 Hrs  T(C): 36.9 (24 Jun 2019 05:12), Max: 36.9 (23 Jun 2019 14:25)  T(F): 98.5 (24 Jun 2019 05:12), Max: 98.5 (24 Jun 2019 05:12)  HR: 81 (24 Jun 2019 05:12) (81 - 92)  BP: 134/80 (24 Jun 2019 05:12) (108/71 - 134/80)  BP(mean): --  RR: 16 (24 Jun 2019 05:12) (16 - 18)  SpO2: 98% (24 Jun 2019 05:12) (95% - 98%)    CBC Full  -  ( 23 Jun 2019 08:48 )  WBC Count : 8.83 K/uL  RBC Count : 5.12 M/uL  Hemoglobin : 14.4 g/dL  Hematocrit : 43.7 %  Platelet Count - Automated : 396 K/uL  Mean Cell Volume : 85.4 fl  Mean Cell Hemoglobin : 28.1 pg  Mean Cell Hemoglobin Concentration : 33.0 gm/dL  Auto Neutrophil # : x  Auto Lymphocyte # : x  Auto Monocyte # : x  Auto Eosinophil # : x  Auto Basophil # : x  Auto Neutrophil % : x  Auto Lymphocyte % : x  Auto Monocyte % : x  Auto Eosinophil % : x  Auto Basophil % : x    06-24    140  |  98  |  18  ----------------------------<  152<H>  3.4<L>   |  26  |  0.64    Ca    9.2      24 Jun 2019 07:01  Mg     1.9     06-24    TPro  7.5  /  Alb  3.0<L>  /  TBili  0.5  /  DBili  0.1  /  AST  20  /  ALT  82<H>  /  AlkPhos  161<H>  06-24    LIVER FUNCTIONS - ( 24 Jun 2019 07:01 )  Alb: 3.0 g/dL / Pro: 7.5 g/dL / ALK PHOS: 161 U/L / ALT: 82 U/L / AST: 20 U/L / GGT: x               MICROBIOLOGY:  .Urine  06-21-19   <10,000 CFU/mL Normal Urogenital Angelia  --  --      .Blood  06-21-19   No growth to date.  --  --        RADIOLOGY  < from: CT Abdomen and Pelvis w/ IV Cont (06.21.19 @ 15:58) >  Findings most consistent with left pyelonephritis; correlate with   urinalysis.    No bowel obstruction.      < from: Xray Chest 1 View AP/PA (06.21.19 @ 12:44) >  Poor inspiratory effort. The heart is normal in size. The lungs are   clear. No acute pathology is seen in the visualized bones.      < from: US Abdomen Complete (06.24.19 @ 09:20) >  Status post postcholecystectomy. Mild intrahepatic biliary   duct dilatation. Common bile duct is 7.8 mm.

## 2019-06-24 NOTE — DISCHARGE NOTE PROVIDER - NSDCCPCAREPLAN_GEN_ALL_CORE_FT
PRINCIPAL DISCHARGE DIAGNOSIS  Diagnosis: Acute pyelonephritis  Assessment and Plan of Treatment: You had a bladder and/or kidney infection.  If you are discharged on antibiotics, you will need to finish the medication as prescribed to reduced the likelihood that the infection will recur.  Avoid medications that will cause urinary retention such as benadryl whenever possible.  Drink adequate fluids - there is no harm in drinking cranberry juice.  Females should urinate right after sex.  Contact your doctor if you experience new symptoms or continued symptoms after treatment, such as pain or burning with urination, frequent urination, urinary urgency, blood in the urine, fever, back pain, and/or nausea vomiting.        SECONDARY DISCHARGE DIAGNOSES  Diagnosis: LFTs abnormal  Assessment and Plan of Treatment: An MRCP is recommended for abnormal liver enzymes and dilated common bile duct - follow up with your Gastroenterologist within 1-2 weeks.    Diagnosis: Psoriatic arthritis  Assessment and Plan of Treatment: Take all medications as prescribed.  Follow up with your Rhematologist within 1-2 weeks.  Do not take Cosentyx until you have followed up with your Rheumatologist.    Diagnosis: Hypokalemia  Assessment and Plan of Treatment: Potassium helps with the function of your nerve and muscle cells, including those in the heart.  Eat adequate amounts of potassium rich foods, but do not overconsume.  Foods that are rich in potassium include bananas, oranges, orange juice, tomatoes, tomato sauce, tomato juice, green leafy vegetables, melons, peas, beans, potatoes, sweet potatoes, avocados, vegetable juices, fruit juices, nuts, and seeds. Avoid foods that are high in salt. Avoid canned and prepared foods that are high in salt. PRINCIPAL DISCHARGE DIAGNOSIS  Diagnosis: Acute pyelonephritis  Assessment and Plan of Treatment: You had a bladder and/or kidney infection.  Please complete full course of antibiotics in order treat infection and reduce the likelihood that the infection will recur.  Avoid medications that will cause urinary retention such as benadryl whenever possible.  Drink adequate fluids - there is no harm in drinking cranberry juice.  Females should urinate right after sex.  Contact your doctor if you experience new symptoms or continued symptoms after treatment, such as pain or burning with urination, frequent urination, urinary urgency, blood in the urine, fever, back pain, and/or nausea vomiting.        SECONDARY DISCHARGE DIAGNOSES  Diagnosis: LFTs abnormal  Assessment and Plan of Treatment: An MRCP is recommended for abnormal liver enzymes and dilated common bile duct - follow up with your Gastroenterologist within 1-2 weeks.    Diagnosis: Psoriatic arthritis  Assessment and Plan of Treatment: Take all medications as prescribed.  Follow up with your Rhematologist within 1-2 weeks.  Do not take Cosentyx until you have followed up with your Rheumatologist.    Diagnosis: Hypokalemia  Assessment and Plan of Treatment: Potassium helps with the function of your nerve and muscle cells, including those in the heart.  Eat adequate amounts of potassium rich foods, but do not overconsume.  Foods that are rich in potassium include bananas, oranges, orange juice, tomatoes, tomato sauce, tomato juice, green leafy vegetables, melons, peas, beans, potatoes, sweet potatoes, avocados, vegetable juices, fruit juices, nuts, and seeds. Avoid foods that are high in salt. Avoid canned and prepared foods that are high in salt. PRINCIPAL DISCHARGE DIAGNOSIS  Diagnosis: Acute pyelonephritis  Assessment and Plan of Treatment: You had a bladder and/or kidney infection.  Please complete full course of antibiotics in order treat infection and reduce the likelihood that the infection will recur.  Avoid medications that will cause urinary retention such as benadryl whenever possible.  Drink adequate fluids - there is no harm in drinking cranberry juice.  Females should urinate right after sex.  Contact your doctor if you experience new symptoms or continued symptoms after treatment, such as pain or burning with urination, frequent urination, urinary urgency, blood in the urine, fever, back pain, and/or nausea vomiting.        SECONDARY DISCHARGE DIAGNOSES  Diagnosis: LFTs abnormal  Assessment and Plan of Treatment: An MRCP is recommended for abnormal liver enzymes and dilated common bile duct - follow up with your Gastroenterologist within 1-2 weeks.    Diagnosis: Psoriatic arthritis  Assessment and Plan of Treatment: Take all medications as prescribed.  Follow up with your Rhematologist within 1-2 weeks.  Do not take Cosentyx until you have followed up with your Rheumatologist.    Diagnosis: Hypokalemia  Assessment and Plan of Treatment: Potassium helps with the function of your nerve and muscle cells, including those in the heart.  Eat adequate amounts of potassium rich foods, but do not overconsume.  Foods that are rich in potassium include bananas, oranges, orange juice, tomatoes, tomato sauce, tomato juice, green leafy vegetables, melons, peas, beans, potatoes, sweet potatoes, avocados, vegetable juices, fruit juices, nuts, and seeds. Avoid foods that are high in salt. Avoid canned and prepared foods that are high in salt.      Diagnosis: Diabetes mellitus  Assessment and Plan of Treatment: Make sure you get your HgA1c checked every three months.  If you take oral diabetes medications, check your blood glucose at least two times a day.  If you take short-acting insulin, check your blood glucose before meals and at bedtime.  It's important not to skip any meals.  Keep a log of your blood glucose results and always take it with you to your doctor appointments.  Keep a list of your current medications including over the counter medications and bring this medication list with you to all your doctor appointments.  If you have not seen your ophthalmologist this year, call for appointment.  Check your feet daily for redness, sores, or openings.  Do not self treat.  If there is no improvement in two days, call your primary care physician for an appointment.  HgA1c this admission was 7.0.  Follow up with Primary Care Doctor within 1 week.

## 2019-06-24 NOTE — PROGRESS NOTE ADULT - REASON FOR ADMISSION
weakness and frequency of urination 1 week

## 2019-06-24 NOTE — CONSULT NOTE ADULT - REASON FOR ADMISSION
weakness and frequency of urination 1 week

## 2019-06-24 NOTE — DISCHARGE NOTE PROVIDER - PROVIDER TOKENS
FREE:[LAST:[Dr. Marycruz Latham],FIRST:[Primary Care Provider],PHONE:[(   )    -],FAX:[(   )    -]],PROVIDER:[TOKEN:[[id number]]],FREE:[LAST:[Dr. Bismark Oquendo],FIRST:[Gastroenterologist],PHONE:[(   )    -],FAX:[(   )    -]],FREE:[LAST:[Dr. Marycruz Herzog],FIRST:[Primary Care Doctor],PHONE:[(   )    -],FAX:[(   )    -]]

## 2019-06-24 NOTE — DISCHARGE NOTE PROVIDER - HOSPITAL COURSE
74 female with PMH of HTN, HLD, DM, psoriatic arthritis (on cosentyx, last injection last week) s/p cholecystectomy - sent by PCP for abn labs - reports weakness x 1 week, difficulty ambulating, decreased PO, and fever.  Patient found to have acute pyelonephritis.  Patient was treated with IV Rocephin, now transitioned to cefpodoxime at [facility].  Patient was seen by hepatology for transaminitis and recommended MRCP - family opting to pursue test as an outpatient; transaminitis has improved.  Patient is cleared for discharge by IM - will be going home today with family, Aide services to resume tomorrow.  Will follow up with PCP, Gastroenterology, and Rheumatology as an outpatient.

## 2019-06-26 LAB
CULTURE RESULTS: SIGNIFICANT CHANGE UP
CULTURE RESULTS: SIGNIFICANT CHANGE UP
SPECIMEN SOURCE: SIGNIFICANT CHANGE UP
SPECIMEN SOURCE: SIGNIFICANT CHANGE UP

## 2023-10-02 ENCOUNTER — EMERGENCY (EMERGENCY)
Facility: HOSPITAL | Age: 78
LOS: 1 days | End: 2023-10-02
Attending: EMERGENCY MEDICINE
Payer: MEDICARE

## 2023-10-02 VITALS
RESPIRATION RATE: 19 BRPM | HEART RATE: 69 BPM | SYSTOLIC BLOOD PRESSURE: 99 MMHG | DIASTOLIC BLOOD PRESSURE: 53 MMHG | WEIGHT: 123.46 LBS | OXYGEN SATURATION: 99 %

## 2023-10-02 PROBLEM — L40.50 ARTHROPATHIC PSORIASIS, UNSPECIFIED: Chronic | Status: ACTIVE | Noted: 2019-06-21

## 2023-10-02 PROBLEM — I10 ESSENTIAL (PRIMARY) HYPERTENSION: Chronic | Status: ACTIVE | Noted: 2019-06-21

## 2023-10-02 PROCEDURE — 92950 HEART/LUNG RESUSCITATION CPR: CPT

## 2023-10-02 PROCEDURE — 82962 GLUCOSE BLOOD TEST: CPT

## 2023-10-02 PROCEDURE — 99285 EMERGENCY DEPT VISIT HI MDM: CPT | Mod: 25

## 2023-10-02 PROCEDURE — 99285 EMERGENCY DEPT VISIT HI MDM: CPT

## 2023-10-02 PROCEDURE — 93010 ELECTROCARDIOGRAM REPORT: CPT

## 2023-10-02 RX ORDER — NOREPINEPHRINE BITARTRATE/D5W 8 MG/250ML
0.05 PLASTIC BAG, INJECTION (ML) INTRAVENOUS
Qty: 8 | Refills: 0 | Status: DISCONTINUED | OUTPATIENT
Start: 2023-10-02 | End: 2023-10-05

## 2023-10-02 RX ORDER — CHLORHEXIDINE GLUCONATE 213 G/1000ML
15 SOLUTION TOPICAL EVERY 12 HOURS
Refills: 0 | Status: DISCONTINUED | OUTPATIENT
Start: 2023-10-02 | End: 2023-10-05

## 2023-10-02 RX ADMIN — Medication 0.05 MICROGRAM(S)/KG/MIN: at 10:30

## 2023-10-02 RX ADMIN — Medication 5.25 MICROGRAM(S)/KG/MIN: at 11:19

## 2023-10-02 NOTE — ED ADULT TRIAGE NOTE - CHIEF COMPLAINT QUOTE
EMS Notification-Syncopal episode while washing her face in the bathroom ,911 called, found with agonal breathing and went pulseless ,intubated in the fied ,shocked x 1 ,epi x 2 and started on levophed ,no recordable vital signs noted on arrival to ER ,ACLS protocol in progress EMS Notification-Syncopal episode while washing her face in the bathroom ,911 called, found with agonal breathing and went pulseless ,intubated in the fied ,shocked x 1 ,epi x 2 and started on levophed .

## 2023-10-02 NOTE — ED ADULT NURSE REASSESSMENT NOTE - NS ED NURSE REASSESS COMMENT FT1
Upon arrival in the ED, pt. has a ROSC by EMS, pt. is started on Levophed drip IV by EMS, Dr. Miller, ED attending at the bedside for a cardiac ultrasound activity. Pt. became pulseless, CPR started. See ACLS and cardiac arrest event. Pt. sustained ROSC. Norepinephrine IV drip continued. Pt. became pulseless again, CPR re-started. See ACLS and cardiac arrest code event. Official time of death called at 10:21 am by Dr. Miller, ED attending. Post-mortem done and completed.

## 2023-10-02 NOTE — ED PROVIDER NOTE - OBJECTIVE STATEMENT
78 yr old female with hx of HTN, DM, Parkinsons presents to ed via ems cardiac arrest s/p rosc around 950a. pt usually with issues sleeping was ok took clonazepam last night. woke up today felt unwell and syncopized. ems called bls noted arrest. acls arrived 930a- intubated and 3 epi, 1 shock for vf. 20 min downtime. daughter states just felt weak.

## 2023-10-02 NOTE — ED PROVIDER NOTE - CLINICAL SUMMARY MEDICAL DECISION MAKING FREE TEXT BOX
78 yr old female with hx of HTN, DM, Parkinsons presents to ed via ems cardiac arrest s/p rosc around 950a. pt usually with issues sleeping was ok took clonazepam last night. woke up today felt unwell and syncopized. ems called bls noted arrest. acls arrived 930a- intubated and 3 epi, 1 shock for vf. 20 min downtime. daughter states just felt weak.     pt arrived ROSC. upon trasnfer no pulse. cpr resumed. epi x2, no shock. pulse obtain. levo started. give bicarb and calcium. pt 5 mins later lost pulse. cpr x 4 rounds epi. no shock. echo no cardiac activity tod 1022. initial end tidal ~35. daughter Ivis [phone] aware at bedside entire time. natural death.  pcp [phone] notified

## 2023-10-02 NOTE — ED ADULT NURSE NOTE - NSFALLHARMRISKINTERV_ED_ALL_ED

## 2023-10-02 NOTE — ED ADULT NURSE NOTE - CHIEF COMPLAINT
The patient is a 78y Female complaining of  The patient is a 78y Female complaining of cardiac arrest.

## 2023-10-02 NOTE — ED ADULT NURSE NOTE - CHIEF COMPLAINT QUOTE
EMS Notification-Syncopal episode while washing her face in the bathroom ,911 called, found with agonal breathing and went pulseless ,intubated in the fied ,shocked x 1 ,epi x 2 and started on levophed . EMS Notification-Syncopal episode while washing her face in the bathroom ,911 called, found with agonal breathing and went pulseless ,intubated in the field ,shocked x 1 ,epi x 3 and started on levophed .

## 2023-10-02 NOTE — ED PROVIDER NOTE - CRITICAL CARE ATTENDING CONTRIBUTION TO CARE
Miller: Due to the presence of cardiac arrest and the risk of sudden rapid deterioration due to my attendance to this patient required critical care time of approx 100 minutes including assessment/reassessment, documentation, ordering and interpreting ancillary studies, discussion with ED staff and consultants, patient and their family, and excludes time spent in teaching of Residents and time spent on separately billable procedures.

## 2025-05-30 NOTE — CONSULT NOTE ADULT - NEGATIVE ALLERGIC REACTIONS
VITAL SIGNS: I have reviewed nursing notes and confirm.  CONSTITUTIONAL: 83 yo F laying on stretcher; in no acute distress.  SKIN: Skin exam is warm and dry. (+) diffuse scattered blanchable erythema.  HEAD: Normocephalic; atraumatic.  EYES: PERRL, EOM intact; conjunctiva and sclera clear.  ENT: No nasal discharge; airway clear. Oropharynx clear, uvula midline. No edema. No drooling or stridor.   CARD: S1, S2 normal; no murmurs, gallops, or rubs. Regular rate and rhythm.  RESP: No wheezes, rales or rhonchi. Speaking in full sentences.   ABD: Normal bowel sounds; soft; non-distended; non-tender; No rebound or guarding. No CVA tenderness.  EXT: Normal ROM. No clubbing, cyanosis or edema.  NEURO: Alert, oriented. Grossly unremarkable. No focal deficits.
no respiratory distress